# Patient Record
Sex: FEMALE | Race: BLACK OR AFRICAN AMERICAN | NOT HISPANIC OR LATINO
[De-identification: names, ages, dates, MRNs, and addresses within clinical notes are randomized per-mention and may not be internally consistent; named-entity substitution may affect disease eponyms.]

---

## 2019-05-20 ENCOUNTER — RESULT REVIEW (OUTPATIENT)
Age: 34
End: 2019-05-20

## 2019-12-04 ENCOUNTER — ASOB RESULT (OUTPATIENT)
Age: 34
End: 2019-12-04

## 2019-12-04 ENCOUNTER — APPOINTMENT (OUTPATIENT)
Dept: ANTEPARTUM | Facility: CLINIC | Age: 34
End: 2019-12-04

## 2019-12-04 ENCOUNTER — APPOINTMENT (OUTPATIENT)
Dept: ANTEPARTUM | Facility: CLINIC | Age: 34
End: 2019-12-04
Payer: COMMERCIAL

## 2019-12-04 ENCOUNTER — OUTPATIENT (OUTPATIENT)
Dept: OUTPATIENT SERVICES | Facility: HOSPITAL | Age: 34
LOS: 1 days | End: 2019-12-04

## 2019-12-04 PROBLEM — Z00.00 ENCOUNTER FOR PREVENTIVE HEALTH EXAMINATION: Status: ACTIVE | Noted: 2019-12-04

## 2019-12-04 PROCEDURE — 76818 FETAL BIOPHYS PROFILE W/NST: CPT | Mod: 26

## 2019-12-04 PROCEDURE — 76811 OB US DETAILED SNGL FETUS: CPT

## 2019-12-04 PROCEDURE — 99201 OFFICE OUTPATIENT NEW 10 MINUTES: CPT | Mod: 25

## 2019-12-10 ENCOUNTER — ASOB RESULT (OUTPATIENT)
Age: 34
End: 2019-12-10

## 2019-12-10 ENCOUNTER — APPOINTMENT (OUTPATIENT)
Dept: ANTEPARTUM | Facility: CLINIC | Age: 34
End: 2019-12-10
Payer: COMMERCIAL

## 2019-12-10 ENCOUNTER — APPOINTMENT (OUTPATIENT)
Dept: ANTEPARTUM | Facility: HOSPITAL | Age: 34
End: 2019-12-10

## 2019-12-10 PROCEDURE — 76819 FETAL BIOPHYS PROFIL W/O NST: CPT

## 2019-12-16 ENCOUNTER — APPOINTMENT (OUTPATIENT)
Dept: ANTEPARTUM | Facility: HOSPITAL | Age: 34
End: 2019-12-16

## 2019-12-16 ENCOUNTER — APPOINTMENT (OUTPATIENT)
Dept: ANTEPARTUM | Facility: CLINIC | Age: 34
End: 2019-12-16
Payer: COMMERCIAL

## 2019-12-16 ENCOUNTER — OUTPATIENT (OUTPATIENT)
Dept: OUTPATIENT SERVICES | Facility: HOSPITAL | Age: 34
LOS: 1 days | End: 2019-12-16

## 2019-12-16 ENCOUNTER — ASOB RESULT (OUTPATIENT)
Age: 34
End: 2019-12-16

## 2019-12-16 VITALS
TEMPERATURE: 97 F | RESPIRATION RATE: 16 BRPM | OXYGEN SATURATION: 99 % | SYSTOLIC BLOOD PRESSURE: 116 MMHG | DIASTOLIC BLOOD PRESSURE: 62 MMHG | HEART RATE: 92 BPM

## 2019-12-16 DIAGNOSIS — Z87.42 PERSONAL HISTORY OF OTHER DISEASES OF THE FEMALE GENITAL TRACT: Chronic | ICD-10-CM

## 2019-12-16 DIAGNOSIS — Z34.90 ENCOUNTER FOR SUPERVISION OF NORMAL PREGNANCY, UNSPECIFIED, UNSPECIFIED TRIMESTER: ICD-10-CM

## 2019-12-16 DIAGNOSIS — Z98.891 HISTORY OF UTERINE SCAR FROM PREVIOUS SURGERY: Chronic | ICD-10-CM

## 2019-12-16 DIAGNOSIS — O34.219 MATERNAL CARE FOR UNSPECIFIED TYPE SCAR FROM PREVIOUS CESAREAN DELIVERY: ICD-10-CM

## 2019-12-16 LAB
APPEARANCE UR: CLEAR — SIGNIFICANT CHANGE UP
BACTERIA # UR AUTO: NEGATIVE — SIGNIFICANT CHANGE UP
BILIRUB UR-MCNC: NEGATIVE — SIGNIFICANT CHANGE UP
BLD GP AB SCN SERPL QL: NEGATIVE — SIGNIFICANT CHANGE UP
BLOOD UR QL VISUAL: SIGNIFICANT CHANGE UP
COLOR SPEC: YELLOW — SIGNIFICANT CHANGE UP
GLUCOSE UR-MCNC: NEGATIVE — SIGNIFICANT CHANGE UP
HCT VFR BLD CALC: 29.8 % — LOW (ref 34.5–45)
HGB BLD-MCNC: 8.8 G/DL — LOW (ref 11.5–15.5)
HYALINE CASTS # UR AUTO: NEGATIVE — SIGNIFICANT CHANGE UP
KETONES UR-MCNC: NEGATIVE — SIGNIFICANT CHANGE UP
LEUKOCYTE ESTERASE UR-ACNC: SIGNIFICANT CHANGE UP
MCHC RBC-ENTMCNC: 24.8 PG — LOW (ref 27–34)
MCHC RBC-ENTMCNC: 29.5 % — LOW (ref 32–36)
MCV RBC AUTO: 83.9 FL — SIGNIFICANT CHANGE UP (ref 80–100)
NITRITE UR-MCNC: NEGATIVE — SIGNIFICANT CHANGE UP
NRBC # FLD: 0 K/UL — SIGNIFICANT CHANGE UP (ref 0–0)
PH UR: 7 — SIGNIFICANT CHANGE UP (ref 5–8)
PLATELET # BLD AUTO: 134 K/UL — LOW (ref 150–400)
PMV BLD: 11 FL — SIGNIFICANT CHANGE UP (ref 7–13)
PROT UR-MCNC: NEGATIVE — SIGNIFICANT CHANGE UP
RBC # BLD: 3.55 M/UL — LOW (ref 3.8–5.2)
RBC # FLD: 23 % — HIGH (ref 10.3–14.5)
RBC CASTS # UR COMP ASSIST: SIGNIFICANT CHANGE UP (ref 0–?)
RH IG SCN BLD-IMP: POSITIVE — SIGNIFICANT CHANGE UP
SP GR SPEC: 1.01 — SIGNIFICANT CHANGE UP (ref 1–1.04)
SQUAMOUS # UR AUTO: SIGNIFICANT CHANGE UP
UROBILINOGEN FLD QL: NORMAL — SIGNIFICANT CHANGE UP
WBC # BLD: 5.62 K/UL — SIGNIFICANT CHANGE UP (ref 3.8–10.5)
WBC # FLD AUTO: 5.62 K/UL — SIGNIFICANT CHANGE UP (ref 3.8–10.5)
WBC UR QL: SIGNIFICANT CHANGE UP (ref 0–?)

## 2019-12-16 PROCEDURE — 76818 FETAL BIOPHYS PROFILE W/NST: CPT | Mod: 26

## 2019-12-16 RX ORDER — SODIUM CHLORIDE 9 MG/ML
1000 INJECTION, SOLUTION INTRAVENOUS
Refills: 0 | Status: DISCONTINUED | OUTPATIENT
Start: 2019-12-23 | End: 2019-12-23

## 2019-12-16 NOTE — OB PST NOTE - NSHPREVIEWOFSYSTEMS_GEN_ALL_CORE
General: No fever, chills, sweating,   Skin: No rashes, itching, or dryness.   Breast: No tenderness, lumps, or nipple discharge      Ophthalmologic: No diplopia, photophobia,   ENMT Symptoms: No hearing difficulty, ear pain, tinnitus, or vertigo. No sinus symptoms, nasal congestion, nasal   discharge, or nasal obstruction    Respiratory and Thorax: No wheezing, dyspnea, cough,   Cardiovascular: Pt c/o of swelling of ankles - evaluated by MD today. No chest pain, palpitations,     Gastrointestinal: No nausea, vomiting,   Genitourinary/ Pelvis: No hematuria, or vaginal bleeding     Musculoskeletal: No arthralgia, arthritis, joint swelling, muscle cramping, muscle weakness, neck pain, arm pain, or leg pain    Neurological: No transient paralysis, weakness, paresthesias, or seizures. No syncope, tremors, vertigo, loss of sensation, difficulty walking, loss of consciousness, hemiparesis, confusion, or facial palsy    Psychiatric: No suicidal ideation, depression, anxiety,   Hematology: No gum bleeding, nose bleeding, or skin lumps    Lymphatic: No enlarged or tender lymph nodes.   Endocrine: No heat or cold intolerance    Immunologic: No recurrent or persistent infections

## 2019-12-16 NOTE — OB PST NOTE - PROBLEM SELECTOR PLAN 1
Pt scheduled for surgery and preop instructions including instructions  for Chlorhexidine use in showering on the day of surgery, given verbally and with use of  written materials, and patient confirming understanding of such instructions using  teach back method.  OR Booking notified of risk for sleep apnea

## 2019-12-16 NOTE — OB PST NOTE - NSANTHOSAYNRD_GEN_A_CORE
No. JOHANNE screening performed.  STOP BANG Legend: 0-2 = LOW Risk; 3-4 = INTERMEDIATE Risk; 5-8 = HIGH Risk

## 2019-12-16 NOTE — OB PST NOTE - HISTORY OF PRESENT ILLNESS
Pt is a 34 yr old female scheduled for Repeat  19 with Dr Salazar - pt is 38 weeks Gestation ANNELISE 19 , T1 -  2016 at 42 weeks for fetal distress - hx of macrosomia and pt seen today by OB with fetal heartbeat heard and pt confirms fetal movement today. Pt denies GDM or HTN during pregnancy - pt denies vaginal bleeding or contractions at this time.

## 2019-12-16 NOTE — OB PST NOTE - MALLAMPATI CLASS
Class II - visualization of the soft palate, fauces, and uvula Class IV (difficult) - the soft palate is not visible at all/with phonation

## 2019-12-16 NOTE — OB PST NOTE - NSHPPHYSICALEXAM_GEN_ALL_CORE
Constitutional: Well Developed, Well Groomed, Well Nourished, No Distress    Eyes: PERRL, EOMI, conjunctiva clear    Ears: Normal    Mouth & Gums: Normal, moist    Neck: Supple, no JVD,   Breast: Normal shape,   Back: Pt c/o of mild lower back pain - Normal shape, ROM intact, strength intact,   Respiratory: Airway patent, breath sounds equal, good air movement, respiration non-labored, clear to auscultation bilateral,     Cardiovascular:  Regular rate and rhythm,   Gastrointestinal: Normal   Extremities: Bilateral ankle edema +2-  No clubbing, cyanosis,  Vascular:  Carotid Pulse normal , Radial Pulse normal, Femoral Pulse normal, DP pulse normal, PT pulse normal    Neurological: alert & oriented x 3, sensation intact, deep reflexes intact, cranial nerve intact, normal strength    Skin: warm and dry, normal color    Lymph Nodes: normal posterior cervical lymph node, normal anterior cervical lymph node, normal supraclavicular lymph node,     Musculoskeletal: ROM intact, no joint swelling, warmth, or calf tenderness. Normal strength    Psychiatric: normal affect, normal behavior

## 2019-12-17 LAB — T PALLIDUM AB TITR SER: NEGATIVE — SIGNIFICANT CHANGE UP

## 2019-12-22 ENCOUNTER — TRANSCRIPTION ENCOUNTER (OUTPATIENT)
Age: 34
End: 2019-12-22

## 2019-12-23 ENCOUNTER — INPATIENT (INPATIENT)
Facility: HOSPITAL | Age: 34
LOS: 2 days | Discharge: HOME | End: 2019-12-26
Attending: OBSTETRICS & GYNECOLOGY | Admitting: OBSTETRICS & GYNECOLOGY
Payer: COMMERCIAL

## 2019-12-23 VITALS
SYSTOLIC BLOOD PRESSURE: 108 MMHG | TEMPERATURE: 98 F | DIASTOLIC BLOOD PRESSURE: 61 MMHG | HEIGHT: 69 IN | HEART RATE: 88 BPM | RESPIRATION RATE: 18 BRPM | WEIGHT: 250 LBS

## 2019-12-23 DIAGNOSIS — O28.4 ABNORMAL RADIOLOGICAL FINDING ON ANTENATAL SCREENING OF MOTHER: ICD-10-CM

## 2019-12-23 DIAGNOSIS — Z87.42 PERSONAL HISTORY OF OTHER DISEASES OF THE FEMALE GENITAL TRACT: Chronic | ICD-10-CM

## 2019-12-23 DIAGNOSIS — O40.3XX0 POLYHYDRAMNIOS, THIRD TRIMESTER, NOT APPLICABLE OR UNSPECIFIED: ICD-10-CM

## 2019-12-23 DIAGNOSIS — Z98.891 HISTORY OF UTERINE SCAR FROM PREVIOUS SURGERY: ICD-10-CM

## 2019-12-23 DIAGNOSIS — O36.63X0 MATERNAL CARE FOR EXCESSIVE FETAL GROWTH, THIRD TRIMESTER, NOT APPLICABLE OR UNSPECIFIED: ICD-10-CM

## 2019-12-23 DIAGNOSIS — Z98.891 HISTORY OF UTERINE SCAR FROM PREVIOUS SURGERY: Chronic | ICD-10-CM

## 2019-12-23 DIAGNOSIS — O34.219 MATERNAL CARE FOR UNSPECIFIED TYPE SCAR FROM PREVIOUS CESAREAN DELIVERY: ICD-10-CM

## 2019-12-23 LAB
ALBUMIN SERPL ELPH-MCNC: 2.8 G/DL — LOW (ref 3.3–5)
ALP SERPL-CCNC: 134 U/L — HIGH (ref 40–120)
ALT FLD-CCNC: 5 U/L — SIGNIFICANT CHANGE UP (ref 4–33)
ANION GAP SERPL CALC-SCNC: 10 MMO/L — SIGNIFICANT CHANGE UP (ref 7–14)
APTT BLD: 30.3 SEC — SIGNIFICANT CHANGE UP (ref 27.5–36.3)
APTT BLD: 35.7 SEC — SIGNIFICANT CHANGE UP (ref 27.5–36.3)
AST SERPL-CCNC: 12 U/L — SIGNIFICANT CHANGE UP (ref 4–32)
BASE EXCESS BLDA CALC-SCNC: -3.3 MMOL/L — SIGNIFICANT CHANGE UP
BASOPHILS # BLD AUTO: 0.01 K/UL — SIGNIFICANT CHANGE UP (ref 0–0.2)
BASOPHILS NFR BLD AUTO: 0.2 % — SIGNIFICANT CHANGE UP (ref 0–2)
BILIRUB SERPL-MCNC: 0.4 MG/DL — SIGNIFICANT CHANGE UP (ref 0.2–1.2)
BLD GP AB SCN SERPL QL: NEGATIVE — SIGNIFICANT CHANGE UP
BUN SERPL-MCNC: 4 MG/DL — LOW (ref 7–23)
CA-I BLDA-SCNC: 1.17 MMOL/L — SIGNIFICANT CHANGE UP (ref 1.15–1.29)
CALCIUM SERPL-MCNC: 8.3 MG/DL — LOW (ref 8.4–10.5)
CHLORIDE SERPL-SCNC: 108 MMOL/L — HIGH (ref 98–107)
CO2 SERPL-SCNC: 19 MMOL/L — LOW (ref 22–31)
CREAT SERPL-MCNC: 0.47 MG/DL — LOW (ref 0.5–1.3)
EOSINOPHIL # BLD AUTO: 0 K/UL — SIGNIFICANT CHANGE UP (ref 0–0.5)
EOSINOPHIL NFR BLD AUTO: 0 % — SIGNIFICANT CHANGE UP (ref 0–6)
FIBRINOGEN PPP-MCNC: 286 MG/DL — LOW (ref 350–510)
GLUCOSE BLDA-MCNC: 121 MG/DL — HIGH (ref 70–99)
GLUCOSE SERPL-MCNC: 101 MG/DL — HIGH (ref 70–99)
HCO3 BLDA-SCNC: 22 MMOL/L — SIGNIFICANT CHANGE UP (ref 22–26)
HCT VFR BLD CALC: 24.9 % — LOW (ref 34.5–45)
HCT VFR BLD CALC: 27.8 % — LOW (ref 34.5–45)
HCT VFR BLDA CALC: 24.1 % — LOW (ref 34.5–46.5)
HGB BLD-MCNC: 7.6 G/DL — LOW (ref 11.5–15.5)
HGB BLD-MCNC: 8.6 G/DL — LOW (ref 11.5–15.5)
HGB BLDA-MCNC: 7.7 G/DL — LOW (ref 11.5–15.5)
IMM GRANULOCYTES NFR BLD AUTO: 1.1 % — SIGNIFICANT CHANGE UP (ref 0–1.5)
INR BLD: 1.01 — SIGNIFICANT CHANGE UP (ref 0.88–1.17)
INR BLD: 1.1 — SIGNIFICANT CHANGE UP (ref 0.88–1.17)
LACTATE BLDA-SCNC: 2.4 MMOL/L — HIGH (ref 0.5–2)
LDH SERPL L TO P-CCNC: 178 U/L — SIGNIFICANT CHANGE UP (ref 135–225)
LYMPHOCYTES # BLD AUTO: 0.93 K/UL — LOW (ref 1–3.3)
LYMPHOCYTES # BLD AUTO: 15.2 % — SIGNIFICANT CHANGE UP (ref 13–44)
MCHC RBC-ENTMCNC: 24.5 PG — LOW (ref 27–34)
MCHC RBC-ENTMCNC: 24.7 PG — LOW (ref 27–34)
MCHC RBC-ENTMCNC: 30.5 % — LOW (ref 32–36)
MCHC RBC-ENTMCNC: 30.9 % — LOW (ref 32–36)
MCV RBC AUTO: 79.9 FL — LOW (ref 80–100)
MCV RBC AUTO: 80.3 FL — SIGNIFICANT CHANGE UP (ref 80–100)
MONOCYTES # BLD AUTO: 0.3 K/UL — SIGNIFICANT CHANGE UP (ref 0–0.9)
MONOCYTES NFR BLD AUTO: 4.9 % — SIGNIFICANT CHANGE UP (ref 2–14)
NEUTROPHILS # BLD AUTO: 4.82 K/UL — SIGNIFICANT CHANGE UP (ref 1.8–7.4)
NEUTROPHILS NFR BLD AUTO: 78.6 % — HIGH (ref 43–77)
NRBC # FLD: 0 K/UL — SIGNIFICANT CHANGE UP (ref 0–0)
NRBC # FLD: 0 K/UL — SIGNIFICANT CHANGE UP (ref 0–0)
PCO2 BLDA: 37 MMHG — SIGNIFICANT CHANGE UP (ref 32–48)
PH BLDA: 7.38 PH — SIGNIFICANT CHANGE UP (ref 7.35–7.45)
PLATELET # BLD AUTO: 101 K/UL — LOW (ref 150–400)
PLATELET # BLD AUTO: 113 K/UL — LOW (ref 150–400)
PMV BLD: 10.3 FL — SIGNIFICANT CHANGE UP (ref 7–13)
PMV BLD: 10.3 FL — SIGNIFICANT CHANGE UP (ref 7–13)
PO2 BLDA: 144 MMHG — HIGH (ref 83–108)
POTASSIUM BLDA-SCNC: 3.8 MMOL/L — SIGNIFICANT CHANGE UP (ref 3.4–4.5)
POTASSIUM SERPL-MCNC: 3.7 MMOL/L — SIGNIFICANT CHANGE UP (ref 3.5–5.3)
POTASSIUM SERPL-SCNC: 3.7 MMOL/L — SIGNIFICANT CHANGE UP (ref 3.5–5.3)
PROT SERPL-MCNC: 5.2 G/DL — LOW (ref 6–8.3)
PROTHROM AB SERPL-ACNC: 11.5 SEC — SIGNIFICANT CHANGE UP (ref 9.8–13.1)
PROTHROM AB SERPL-ACNC: 12.6 SEC — SIGNIFICANT CHANGE UP (ref 9.8–13.1)
RBC # BLD: 3.1 M/UL — LOW (ref 3.8–5.2)
RBC # BLD: 3.48 M/UL — LOW (ref 3.8–5.2)
RBC # FLD: 21.9 % — HIGH (ref 10.3–14.5)
RBC # FLD: 22.1 % — HIGH (ref 10.3–14.5)
RH IG SCN BLD-IMP: POSITIVE — SIGNIFICANT CHANGE UP
SAO2 % BLDA: 98.6 % — SIGNIFICANT CHANGE UP (ref 95–99)
SODIUM BLDA-SCNC: 134 MMOL/L — LOW (ref 136–146)
SODIUM SERPL-SCNC: 137 MMOL/L — SIGNIFICANT CHANGE UP (ref 135–145)
URATE SERPL-MCNC: 2.9 MG/DL — SIGNIFICANT CHANGE UP (ref 2.5–7)
WBC # BLD: 4.91 K/UL — SIGNIFICANT CHANGE UP (ref 3.8–10.5)
WBC # BLD: 6.13 K/UL — SIGNIFICANT CHANGE UP (ref 3.8–10.5)
WBC # FLD AUTO: 4.91 K/UL — SIGNIFICANT CHANGE UP (ref 3.8–10.5)
WBC # FLD AUTO: 6.13 K/UL — SIGNIFICANT CHANGE UP (ref 3.8–10.5)

## 2019-12-23 RX ORDER — DIPHENHYDRAMINE HCL 50 MG
25 CAPSULE ORAL EVERY 6 HOURS
Refills: 0 | Status: DISCONTINUED | OUTPATIENT
Start: 2019-12-23 | End: 2019-12-23

## 2019-12-23 RX ORDER — SODIUM CHLORIDE 9 MG/ML
1000 INJECTION, SOLUTION INTRAVENOUS ONCE
Refills: 0 | Status: COMPLETED | OUTPATIENT
Start: 2019-12-23 | End: 2019-12-23

## 2019-12-23 RX ORDER — LANOLIN
1 OINTMENT (GRAM) TOPICAL EVERY 6 HOURS
Refills: 0 | Status: DISCONTINUED | OUTPATIENT
Start: 2019-12-23 | End: 2019-12-23

## 2019-12-23 RX ORDER — TETANUS TOXOID, REDUCED DIPHTHERIA TOXOID AND ACELLULAR PERTUSSIS VACCINE, ADSORBED 5; 2.5; 8; 8; 2.5 [IU]/.5ML; [IU]/.5ML; UG/.5ML; UG/.5ML; UG/.5ML
0.5 SUSPENSION INTRAMUSCULAR ONCE
Refills: 0 | Status: DISCONTINUED | OUTPATIENT
Start: 2019-12-23 | End: 2019-12-23

## 2019-12-23 RX ORDER — HEPARIN SODIUM 5000 [USP'U]/ML
5000 INJECTION INTRAVENOUS; SUBCUTANEOUS EVERY 12 HOURS
Refills: 0 | Status: DISCONTINUED | OUTPATIENT
Start: 2019-12-23 | End: 2019-12-23

## 2019-12-23 RX ORDER — DIPHENHYDRAMINE HCL 50 MG
25 CAPSULE ORAL EVERY 6 HOURS
Refills: 0 | Status: DISCONTINUED | OUTPATIENT
Start: 2019-12-23 | End: 2019-12-26

## 2019-12-23 RX ORDER — OXYTOCIN 10 UNIT/ML
41.67 VIAL (ML) INJECTION
Qty: 20 | Refills: 0 | Status: DISCONTINUED | OUTPATIENT
Start: 2019-12-23 | End: 2019-12-23

## 2019-12-23 RX ORDER — METOCLOPRAMIDE HCL 10 MG
10 TABLET ORAL ONCE
Refills: 0 | Status: COMPLETED | OUTPATIENT
Start: 2019-12-23 | End: 2019-12-23

## 2019-12-23 RX ORDER — SODIUM CHLORIDE 9 MG/ML
1000 INJECTION, SOLUTION INTRAVENOUS
Refills: 0 | Status: DISCONTINUED | OUTPATIENT
Start: 2019-12-23 | End: 2019-12-23

## 2019-12-23 RX ORDER — FAMOTIDINE 10 MG/ML
20 INJECTION INTRAVENOUS ONCE
Refills: 0 | Status: COMPLETED | OUTPATIENT
Start: 2019-12-23 | End: 2019-12-23

## 2019-12-23 RX ORDER — KETOROLAC TROMETHAMINE 30 MG/ML
30 SYRINGE (ML) INJECTION EVERY 6 HOURS
Refills: 0 | Status: DISCONTINUED | OUTPATIENT
Start: 2019-12-23 | End: 2019-12-23

## 2019-12-23 RX ORDER — OXYCODONE HYDROCHLORIDE 5 MG/1
5 TABLET ORAL
Refills: 0 | Status: DISCONTINUED | OUTPATIENT
Start: 2019-12-23 | End: 2019-12-23

## 2019-12-23 RX ORDER — MAGNESIUM HYDROXIDE 400 MG/1
30 TABLET, CHEWABLE ORAL
Refills: 0 | Status: DISCONTINUED | OUTPATIENT
Start: 2019-12-23 | End: 2019-12-26

## 2019-12-23 RX ORDER — ACETAMINOPHEN 500 MG
975 TABLET ORAL
Refills: 0 | Status: DISCONTINUED | OUTPATIENT
Start: 2019-12-23 | End: 2019-12-26

## 2019-12-23 RX ORDER — MAGNESIUM HYDROXIDE 400 MG/1
30 TABLET, CHEWABLE ORAL
Refills: 0 | Status: DISCONTINUED | OUTPATIENT
Start: 2019-12-23 | End: 2019-12-23

## 2019-12-23 RX ORDER — CEFAZOLIN SODIUM 1 G
2000 VIAL (EA) INJECTION EVERY 8 HOURS
Refills: 0 | Status: COMPLETED | OUTPATIENT
Start: 2019-12-23 | End: 2019-12-24

## 2019-12-23 RX ORDER — INFLUENZA VIRUS VACCINE 15; 15; 15; 15 UG/.5ML; UG/.5ML; UG/.5ML; UG/.5ML
0.5 SUSPENSION INTRAMUSCULAR ONCE
Refills: 0 | Status: DISCONTINUED | OUTPATIENT
Start: 2019-12-23 | End: 2019-12-26

## 2019-12-23 RX ORDER — GLYCERIN ADULT
1 SUPPOSITORY, RECTAL RECTAL AT BEDTIME
Refills: 0 | Status: DISCONTINUED | OUTPATIENT
Start: 2019-12-23 | End: 2019-12-23

## 2019-12-23 RX ORDER — SIMETHICONE 80 MG/1
80 TABLET, CHEWABLE ORAL EVERY 4 HOURS
Refills: 0 | Status: DISCONTINUED | OUTPATIENT
Start: 2019-12-23 | End: 2019-12-26

## 2019-12-23 RX ORDER — HYDROMORPHONE HYDROCHLORIDE 2 MG/ML
1 INJECTION INTRAMUSCULAR; INTRAVENOUS; SUBCUTANEOUS ONCE
Refills: 0 | Status: DISCONTINUED | OUTPATIENT
Start: 2019-12-23 | End: 2019-12-23

## 2019-12-23 RX ORDER — ACETAMINOPHEN 500 MG
975 TABLET ORAL
Refills: 0 | Status: DISCONTINUED | OUTPATIENT
Start: 2019-12-23 | End: 2019-12-23

## 2019-12-23 RX ORDER — OXYCODONE HYDROCHLORIDE 5 MG/1
5 TABLET ORAL ONCE
Refills: 0 | Status: DISCONTINUED | OUTPATIENT
Start: 2019-12-23 | End: 2019-12-26

## 2019-12-23 RX ORDER — SODIUM CHLORIDE 9 MG/ML
1000 INJECTION, SOLUTION INTRAVENOUS
Refills: 0 | Status: DISCONTINUED | OUTPATIENT
Start: 2019-12-23 | End: 2019-12-24

## 2019-12-23 RX ORDER — CITRIC ACID/SODIUM CITRATE 300-500 MG
30 SOLUTION, ORAL ORAL ONCE
Refills: 0 | Status: COMPLETED | OUTPATIENT
Start: 2019-12-23 | End: 2019-12-23

## 2019-12-23 RX ORDER — IBUPROFEN 200 MG
600 TABLET ORAL EVERY 6 HOURS
Refills: 0 | Status: DISCONTINUED | OUTPATIENT
Start: 2019-12-23 | End: 2019-12-23

## 2019-12-23 RX ORDER — CARBOPROST TROMETHAMINE 250 UG/ML
250 INJECTION, SOLUTION INTRAMUSCULAR ONCE
Refills: 0 | Status: COMPLETED | OUTPATIENT
Start: 2019-12-23 | End: 2019-12-23

## 2019-12-23 RX ORDER — GLYCERIN ADULT
1 SUPPOSITORY, RECTAL RECTAL AT BEDTIME
Refills: 0 | Status: DISCONTINUED | OUTPATIENT
Start: 2019-12-23 | End: 2019-12-26

## 2019-12-23 RX ORDER — OXYTOCIN 10 UNIT/ML
41.67 VIAL (ML) INJECTION
Qty: 20 | Refills: 0 | Status: DISCONTINUED | OUTPATIENT
Start: 2019-12-23 | End: 2019-12-24

## 2019-12-23 RX ORDER — TETANUS TOXOID, REDUCED DIPHTHERIA TOXOID AND ACELLULAR PERTUSSIS VACCINE, ADSORBED 5; 2.5; 8; 8; 2.5 [IU]/.5ML; [IU]/.5ML; UG/.5ML; UG/.5ML; UG/.5ML
0.5 SUSPENSION INTRAMUSCULAR ONCE
Refills: 0 | Status: DISCONTINUED | OUTPATIENT
Start: 2019-12-23 | End: 2019-12-26

## 2019-12-23 RX ORDER — IBUPROFEN 200 MG
600 TABLET ORAL EVERY 6 HOURS
Refills: 0 | Status: COMPLETED | OUTPATIENT
Start: 2019-12-23 | End: 2020-11-20

## 2019-12-23 RX ORDER — OXYCODONE HYDROCHLORIDE 5 MG/1
5 TABLET ORAL ONCE
Refills: 0 | Status: DISCONTINUED | OUTPATIENT
Start: 2019-12-23 | End: 2019-12-23

## 2019-12-23 RX ORDER — ACETAMINOPHEN 500 MG
1000 TABLET ORAL ONCE
Refills: 0 | Status: COMPLETED | OUTPATIENT
Start: 2019-12-23 | End: 2019-12-24

## 2019-12-23 RX ORDER — TRANEXAMIC ACID 100 MG/ML
1000 INJECTION, SOLUTION INTRAVENOUS ONCE
Refills: 0 | Status: COMPLETED | OUTPATIENT
Start: 2019-12-23 | End: 2019-12-23

## 2019-12-23 RX ORDER — SIMETHICONE 80 MG/1
80 TABLET, CHEWABLE ORAL EVERY 4 HOURS
Refills: 0 | Status: DISCONTINUED | OUTPATIENT
Start: 2019-12-23 | End: 2019-12-23

## 2019-12-23 RX ORDER — LANOLIN
1 OINTMENT (GRAM) TOPICAL EVERY 6 HOURS
Refills: 0 | Status: DISCONTINUED | OUTPATIENT
Start: 2019-12-23 | End: 2019-12-26

## 2019-12-23 RX ORDER — OXYCODONE HYDROCHLORIDE 5 MG/1
5 TABLET ORAL
Refills: 0 | Status: COMPLETED | OUTPATIENT
Start: 2019-12-23 | End: 2019-12-30

## 2019-12-23 RX ORDER — HEPARIN SODIUM 5000 [USP'U]/ML
5000 INJECTION INTRAVENOUS; SUBCUTANEOUS EVERY 12 HOURS
Refills: 0 | Status: DISCONTINUED | OUTPATIENT
Start: 2019-12-23 | End: 2019-12-24

## 2019-12-23 RX ADMIN — HYDROMORPHONE HYDROCHLORIDE 1 MILLIGRAM(S): 2 INJECTION INTRAMUSCULAR; INTRAVENOUS; SUBCUTANEOUS at 19:06

## 2019-12-23 RX ADMIN — Medication 0.2 MILLIGRAM(S): at 18:59

## 2019-12-23 RX ADMIN — SODIUM CHLORIDE 2000 MILLILITER(S): 9 INJECTION, SOLUTION INTRAVENOUS at 18:27

## 2019-12-23 RX ADMIN — SODIUM CHLORIDE 125 MILLILITER(S): 9 INJECTION, SOLUTION INTRAVENOUS at 12:29

## 2019-12-23 RX ADMIN — FAMOTIDINE 20 MILLIGRAM(S): 10 INJECTION INTRAVENOUS at 12:29

## 2019-12-23 RX ADMIN — Medication 10 MILLIGRAM(S): at 12:28

## 2019-12-23 RX ADMIN — HYDROMORPHONE HYDROCHLORIDE 1 MILLIGRAM(S): 2 INJECTION INTRAMUSCULAR; INTRAVENOUS; SUBCUTANEOUS at 21:19

## 2019-12-23 RX ADMIN — SODIUM CHLORIDE 2000 MILLILITER(S): 9 INJECTION, SOLUTION INTRAVENOUS at 12:29

## 2019-12-23 RX ADMIN — CARBOPROST TROMETHAMINE 250 MICROGRAM(S): 250 INJECTION, SOLUTION INTRAMUSCULAR at 19:47

## 2019-12-23 RX ADMIN — Medication 125 MILLIUNIT(S)/MIN: at 18:28

## 2019-12-23 RX ADMIN — TRANEXAMIC ACID 220 MILLIGRAM(S): 100 INJECTION, SOLUTION INTRAVENOUS at 19:04

## 2019-12-23 RX ADMIN — SODIUM CHLORIDE 75 MILLILITER(S): 9 INJECTION, SOLUTION INTRAVENOUS at 21:31

## 2019-12-23 RX ADMIN — Medication 125 MILLIUNIT(S)/MIN: at 21:31

## 2019-12-23 RX ADMIN — Medication 30 MILLILITER(S): at 12:29

## 2019-12-23 RX ADMIN — Medication 0.2 MILLIGRAM(S): at 19:49

## 2019-12-23 NOTE — OB PROVIDER H&P - ASSESSMENT
Admit to L&D  maverick Arnav/ MD Martin  NPO  routine labs  EFM/TOCO  Bedside TLTAS  anesthesia consult  Gita Raygoza PAC  12-23-19 @ 11:48

## 2019-12-23 NOTE — PROVIDER CONTACT NOTE (OTHER) - ACTION/TREATMENT ORDERED:
VE by MD. OB and anesthesia teams called to beside stat. Methergine 0.2mg, TXA 1g, Dilaudid 1mg, bakri balloon placed, x-match 2 units PC's, 2nd PIV line, labs-CBC, coags, CMP, cytotec 1000mcg, to OR

## 2019-12-23 NOTE — PROVIDER CONTACT NOTE (OTHER) - SITUATION
RN called to PACU 5 by family member reporting pt states she feels a lot of bleeding. Excessive vaginal bleeding with clots noted.

## 2019-12-23 NOTE — OB PROVIDER H&P - ATTENDING COMMENTS
35 yo P1 with IUP 39 wks ga presents for elective RCS. patient obstetrical history significant for previous cs for macrosomia 10lbs and chronic anemia., gestational thrombocytopenia. Patient has received several IV iron infusions for anemia. Otherwise current prenatal course uncomplicated. Patient offers no complaints today. Reports active fetal movements. Indications of RCS reviewed due to LGA with current pregnancy.  Risks and benefits discussed. Risks include but not limited to scar tissue formation, intraop blood loss, injury to adjacent organs. Consent signed and witnessed. All questions and concerns addressed. Anticipate uncomplicated intraop and postop course. MBeauvil

## 2019-12-23 NOTE — OB PROVIDER H&P - NSHPLABSRESULTS_GEN_ALL_CORE
Complete Blood Count (12.16.19 @ 17:05)    WBC Count: 5.62 K/uL    Hemoglobin: 8.8 g/dL    Hematocrit: 29.8 %    Platelet Count - Automated: 134 K/uL      Type + Screen (12.16.19 @ 15:56)    ABO Interpretation: A    Rh Interpretation: Positive    Antibody Screen: Negative

## 2019-12-23 NOTE — CHART NOTE - NSCHARTNOTEFT_GEN_A_CORE
R4 Note    Called to PACU to evaluate pt for increase postoperative vaginal bleeding. Pt s/p scheduled rLTCS with QBL 817ml. Pt recovering well in PACU until bleeding.   Pt seen at bedside, brisk VB noted on evaluation. Vaginal exam expressed ~500ml of bright red bleeding and clots. Cervical os opened 1-2cm and OMAR noted to be atonic. Fundus firm on evaluation. Called for ultrasound, safety officers. Methergine IM administered, IV TXA administered. Bakri balloon placed under ultrasound guidance but with minimal output (likely 2/2 kinking). Nursing unable to get IV at bedside at this time and anesthesia called. Of note, pt mentating well throughout event, Dilaudid 1mg administered for pain control. VS noted to be wnl at this time. Cytotec CO administered. Vaginal packing x 3 performed and decision made to transfer to OR for further evaluation and management as pt continued to have brisk bleeding.    Dr. Salazar notified of events during pt transfer to OR.    In OR, pt placed in dorsolithotomy position. 2 large bore IV and R radial A-line placed by anesthesia, STAT labs sent. Vaginal packing and Bakri removed at this time and vagina and cervix re-assessed for lacerations. No lacerations noted, bleeding likely 2/2 OMAR atony. Decision made to place second Bakri balloon. Bakri balloon placed under sono guidance with output noted to be 125ml intitially.  x 3 placed without incident. Pitocin bolus continued, methergine#2 administered and hemabate given.    2uPRBC administered as fast as possible and 1u 5%albumin. Pt noted to be tachycardic (120-130s during Bakri placement w/tachycardia downtrending to 90s). BPs holding wnl, not requiring pressors. Ancef re-dosed by anesthesia (2g).    STAT labs:                          7.6    6.13  )-----------( 101      ( 23 Dec 2019 19:30 )             24.9     PT/INR - ( 23 Dec 2019 19:30 )   PT: 12.6 SEC;   INR: 1.10          PTT - ( 23 Dec 2019 19:30 )  PTT:35.7 SEC    Fibrinogen 286    Lactate 2.4    Dr. Salazar at bedside after event. Plan to watch pt, VS, Bakri and UOP in OR for another 30 min and then transfer to PACU for further close observation. Pt to have CBC/CMP/coags/lactate repeat in 4 hours (12:30am). Plan to c/w Bakri and . Ancef 2g q8h x24 hrs of Bakri.    Dr. Corcoran, Dr. Gallo, and Dr. Hunter at bedside during event.  Jesús, pgy4

## 2019-12-23 NOTE — CHART NOTE - NSCHARTNOTEFT_GEN_A_CORE
Attending Note    Was notified by Attending () regarding evaluation of patient for postpartum hemorrhage in the PACU. Patient reports she felt fluid coming out of vagina thought it was urine. Upon evaluation by RN brisk bright red blood was noted on chucks. Senior resident and Attending evaluated patient and diagnosed patient with hemorrhage. Patient received multiple uterotonics and Bakri balloon. Patient was taken to OR where vaginal exam was performed thoroughly.  Large amount of blood noted in vaginal vault. Atony of lower uterine segment noted. Bakri replaced and three vaginal packings inserted inside of vagina.  UPon my arrival to the OR 35minutes later patient was reassessed. Patient is s/p 2units of PRBCS and 1unit of Albumin. Patient was awake alert, oriented x 3, normotensive and stable vital signs. Uterine fundus was noted to be firm adequate urine output 7-8pm UO 175ml and Bakri 125ml form 7-8pm. Stat h/h 7.4/24.  Will continue to monitor vitals closely and output.  Plan of care discussed with patient and .     VS T36.9   P 107/68  P 94 O2 sat 100% on 2L NC    heent nl  abd fundus firm  ext no CCe  Neuro AAOx 3    A:  POD #2 s/p Scheduled RCS with postpartum hemorrhage secondary to atony of lower uterine segment, s/p multiple uterotonics, TXA 1g IV, s/p placement of Bakri balloon and vaginal packing, s/p 2units of PRBCs and 1unit of Albumin, ( ml intraop     1940ml  postop)   P: Strict I's O's  P: Monitor hourly output of Bakri balloon     Continue Ancef x 24hrs     Repeat labs 12am     IR on standy     Patient understands if conservative management fails, surgical intervention will be warranted     All questions and concerns addressed     Mary

## 2019-12-23 NOTE — PROVIDER CONTACT NOTE (OTHER) - BACKGROUND
s/p repeat c/s at 39 weeks. Delivered female baby at 1633, 4070g. QBL by triton 817 ml intra-op. Pt admitted to PACU at 1734. Pt has hx of anemia with multiple iron transfusions during the pregnancy.

## 2019-12-23 NOTE — OB PROVIDER H&P - NSHPPHYSICALEXAM_GEN_ALL_CORE
SpO2: --Height (cm): 175.26 (12-23-19 @ 10:30)  Weight (kg): 113.4 (12-23-19 @ 10:30)  BMI (kg/m2): 36.9 (12-23-19 @ 10:30)  BSA (m2): 2.27 (12-23-19 @ 10:30)    A&Ox3  Heart: RRR, S1&S2, no S3  Lungs: Clear bilateral to auscultation, good inspiratory /expiratory effort              no rhonchi, no rales  Abd: soft, Gravid, TOCO in place           +pfannenstial scar  EFM: 120bpm/moderate variabilty/+accelerations/no decelerations/CAT 1  TOCO:  no UC  Ext:  FROM / no edema

## 2019-12-23 NOTE — OB PROVIDER DELIVERY SUMMARY - NSPROVIDERDELIVERYNOTE_OBGYN_ALL_OB_FT
A live female infant was born in LOT position via Repeat LTCS and lysis of adhesions. Apgar 8-8, 4070g.  Normal tubes and ovaries b/l. Urinary bladder adhered on Pyrimidalis muscles. Dense adhesions along abdominal wall and fascia.  No intraop complications. IVF 3L LR. UO 60ml clear urine. Sponge,needle and instrument count correct x 2. Infant to  nursery in stable condition. Mother to recovery room in stable condition. See full dictation. MBeauvil

## 2019-12-23 NOTE — PROVIDER CONTACT NOTE (OTHER) - ASSESSMENT
VSS. Pt alert and oriented.  Uterus firm and  1F above the umbilicus. Lochia excessive with clots. EBL 1000cc by MD's. Tolbert catheter draining clear yellow urine. Total IVF infusing at 200cc/h. Pt recevied 1 liter bolus RL when admitted to PACU for low B/P.

## 2019-12-23 NOTE — BRIEF OPERATIVE NOTE - OPERATION/FINDINGS
Grossly normal uterus, tubes and ovaries bilaterally. Viable female infant delivered from vertex presentation. Clear fluid on amniotomy. Vigorous cry on the abdomen. APGARS 8,8. Dense scar tissue under fascia, taken down sharply with the scalpel. Maylard incision used on rectus muscles due adherent bladder inferiorly. Hysterotomy closed in a running locked fashion in a single layer. Grossly normal uterus, tubes and ovaries bilaterally. Viable female infant delivered from vertex presentation. Clear fluid on amniotomy. Vigorous cry on the abdomen. APGARS 8,8. Weight 4070g. Dense scar tissue under fascia, taken down sharply with the scalpel. Maylard incision used on rectus muscles due adherent bladder inferiorly. Hysterotomy closed in a running locked fashion in a single layer.

## 2019-12-23 NOTE — OB PROVIDER H&P - HISTORY OF PRESENT ILLNESS
33yo female  EDC/ presents@39wks for scheduled   rltcs due to.  +AP course iron infusions due to severe anemia, last infusion .  Anemia of unknown cause, hematological  w/u  negative, otherwise unremarkable.   Denies VB,ROM or UCs today.  +FM

## 2019-12-24 ENCOUNTER — APPOINTMENT (OUTPATIENT)
Dept: ANTEPARTUM | Facility: HOSPITAL | Age: 34
End: 2019-12-24

## 2019-12-24 ENCOUNTER — ASOB RESULT (OUTPATIENT)
Age: 34
End: 2019-12-24

## 2019-12-24 PROBLEM — D64.9 ANEMIA, UNSPECIFIED: Chronic | Status: ACTIVE | Noted: 2019-12-16

## 2019-12-24 LAB
ALBUMIN SERPL ELPH-MCNC: 2.6 G/DL — LOW (ref 3.3–5)
ALBUMIN SERPL ELPH-MCNC: 2.7 G/DL — LOW (ref 3.3–5)
ALP SERPL-CCNC: 101 U/L — SIGNIFICANT CHANGE UP (ref 40–120)
ALP SERPL-CCNC: 111 U/L — SIGNIFICANT CHANGE UP (ref 40–120)
ALT FLD-CCNC: 5 U/L — SIGNIFICANT CHANGE UP (ref 4–33)
ALT FLD-CCNC: 5 U/L — SIGNIFICANT CHANGE UP (ref 4–33)
ANION GAP SERPL CALC-SCNC: 11 MMO/L — SIGNIFICANT CHANGE UP (ref 7–14)
ANION GAP SERPL CALC-SCNC: 11 MMO/L — SIGNIFICANT CHANGE UP (ref 7–14)
ANION GAP SERPL CALC-SCNC: 12 MMO/L — SIGNIFICANT CHANGE UP (ref 7–14)
ANION GAP SERPL CALC-SCNC: 9 MMO/L — SIGNIFICANT CHANGE UP (ref 7–14)
APTT BLD: 30.7 SEC — SIGNIFICANT CHANGE UP (ref 27.5–36.3)
APTT BLD: 30.8 SEC — SIGNIFICANT CHANGE UP (ref 27.5–36.3)
APTT BLD: 31.4 SEC — SIGNIFICANT CHANGE UP (ref 27.5–36.3)
APTT BLD: 33.3 SEC — SIGNIFICANT CHANGE UP (ref 27.5–36.3)
APTT BLD: 34.5 SEC — SIGNIFICANT CHANGE UP (ref 27.5–36.3)
AST SERPL-CCNC: 13 U/L — SIGNIFICANT CHANGE UP (ref 4–32)
AST SERPL-CCNC: 14 U/L — SIGNIFICANT CHANGE UP (ref 4–32)
BASE EXCESS BLDA CALC-SCNC: -0.7 MMOL/L — SIGNIFICANT CHANGE UP
BASOPHILS # BLD AUTO: 0.02 K/UL — SIGNIFICANT CHANGE UP (ref 0–0.2)
BASOPHILS NFR BLD AUTO: 0.2 % — SIGNIFICANT CHANGE UP (ref 0–2)
BILIRUB SERPL-MCNC: 0.8 MG/DL — SIGNIFICANT CHANGE UP (ref 0.2–1.2)
BILIRUB SERPL-MCNC: 0.9 MG/DL — SIGNIFICANT CHANGE UP (ref 0.2–1.2)
BUN SERPL-MCNC: 5 MG/DL — LOW (ref 7–23)
CALCIUM SERPL-MCNC: 7.9 MG/DL — LOW (ref 8.4–10.5)
CALCIUM SERPL-MCNC: 8.1 MG/DL — LOW (ref 8.4–10.5)
CALCIUM SERPL-MCNC: 8.2 MG/DL — LOW (ref 8.4–10.5)
CALCIUM SERPL-MCNC: 8.3 MG/DL — LOW (ref 8.4–10.5)
CHLORIDE BLDA-SCNC: 103 MMOL/L — SIGNIFICANT CHANGE UP (ref 96–108)
CHLORIDE SERPL-SCNC: 104 MMOL/L — SIGNIFICANT CHANGE UP (ref 98–107)
CHLORIDE SERPL-SCNC: 105 MMOL/L — SIGNIFICANT CHANGE UP (ref 98–107)
CHLORIDE SERPL-SCNC: 105 MMOL/L — SIGNIFICANT CHANGE UP (ref 98–107)
CHLORIDE SERPL-SCNC: 106 MMOL/L — SIGNIFICANT CHANGE UP (ref 98–107)
CO2 SERPL-SCNC: 19 MMOL/L — LOW (ref 22–31)
CO2 SERPL-SCNC: 20 MMOL/L — LOW (ref 22–31)
CO2 SERPL-SCNC: 21 MMOL/L — LOW (ref 22–31)
CO2 SERPL-SCNC: 21 MMOL/L — LOW (ref 22–31)
CREAT SERPL-MCNC: 0.42 MG/DL — LOW (ref 0.5–1.3)
CREAT SERPL-MCNC: 0.45 MG/DL — LOW (ref 0.5–1.3)
CREAT SERPL-MCNC: 0.45 MG/DL — LOW (ref 0.5–1.3)
CREAT SERPL-MCNC: 0.46 MG/DL — LOW (ref 0.5–1.3)
EOSINOPHIL # BLD AUTO: 0.01 K/UL — SIGNIFICANT CHANGE UP (ref 0–0.5)
EOSINOPHIL NFR BLD AUTO: 0.1 % — SIGNIFICANT CHANGE UP (ref 0–6)
FIBRINOGEN PPP-MCNC: 251 MG/DL — LOW (ref 350–510)
FIBRINOGEN PPP-MCNC: 251.1 MG/DL — LOW (ref 350–510)
FIBRINOGEN PPP-MCNC: 348 MG/DL — LOW (ref 350–510)
FIBRINOGEN PPP-MCNC: 394.8 MG/DL — SIGNIFICANT CHANGE UP (ref 350–510)
GLUCOSE BLDA-MCNC: 94 MG/DL — SIGNIFICANT CHANGE UP (ref 70–99)
GLUCOSE SERPL-MCNC: 109 MG/DL — HIGH (ref 70–99)
GLUCOSE SERPL-MCNC: 88 MG/DL — SIGNIFICANT CHANGE UP (ref 70–99)
GLUCOSE SERPL-MCNC: 97 MG/DL — SIGNIFICANT CHANGE UP (ref 70–99)
GLUCOSE SERPL-MCNC: 97 MG/DL — SIGNIFICANT CHANGE UP (ref 70–99)
HCO3 BLDA-SCNC: 24 MMOL/L — SIGNIFICANT CHANGE UP (ref 22–26)
HCT VFR BLD CALC: 20.6 % — CRITICAL LOW (ref 34.5–45)
HCT VFR BLD CALC: 21.1 % — LOW (ref 34.5–45)
HCT VFR BLD CALC: 22.3 % — LOW (ref 34.5–45)
HCT VFR BLD CALC: 24 % — LOW (ref 34.5–45)
HCT VFR BLD CALC: 24.2 % — LOW (ref 34.5–45)
HCT VFR BLDA CALC: 23.5 % — LOW (ref 34.5–46.5)
HGB BLD-MCNC: 6.7 G/DL — CRITICAL LOW (ref 11.5–15.5)
HGB BLD-MCNC: 7 G/DL — CRITICAL LOW (ref 11.5–15.5)
HGB BLD-MCNC: 7.2 G/DL — LOW (ref 11.5–15.5)
HGB BLD-MCNC: 8.1 G/DL — LOW (ref 11.5–15.5)
HGB BLD-MCNC: 8.1 G/DL — LOW (ref 11.5–15.5)
HGB BLDA-MCNC: 7.5 G/DL — LOW (ref 11.5–15.5)
IMM GRANULOCYTES NFR BLD AUTO: 1.2 % — SIGNIFICANT CHANGE UP (ref 0–1.5)
INR BLD: 1.06 — SIGNIFICANT CHANGE UP (ref 0.88–1.17)
INR BLD: 1.1 — SIGNIFICANT CHANGE UP (ref 0.88–1.17)
INR BLD: 1.14 — SIGNIFICANT CHANGE UP (ref 0.88–1.17)
INR BLD: 1.19 — HIGH (ref 0.88–1.17)
INR BLD: 1.21 — HIGH (ref 0.88–1.17)
LACTATE BLDA-SCNC: 1.5 MMOL/L — SIGNIFICANT CHANGE UP (ref 0.5–2)
LYMPHOCYTES # BLD AUTO: 0.93 K/UL — LOW (ref 1–3.3)
LYMPHOCYTES # BLD AUTO: 9.3 % — LOW (ref 13–44)
MAGNESIUM SERPL-MCNC: 1.2 MG/DL — LOW (ref 1.6–2.6)
MAGNESIUM SERPL-MCNC: 2 MG/DL — SIGNIFICANT CHANGE UP (ref 1.6–2.6)
MCHC RBC-ENTMCNC: 25.4 PG — LOW (ref 27–34)
MCHC RBC-ENTMCNC: 26 PG — LOW (ref 27–34)
MCHC RBC-ENTMCNC: 26.5 PG — LOW (ref 27–34)
MCHC RBC-ENTMCNC: 26.9 PG — LOW (ref 27–34)
MCHC RBC-ENTMCNC: 26.9 PG — LOW (ref 27–34)
MCHC RBC-ENTMCNC: 32.3 % — SIGNIFICANT CHANGE UP (ref 32–36)
MCHC RBC-ENTMCNC: 33 % — SIGNIFICANT CHANGE UP (ref 32–36)
MCHC RBC-ENTMCNC: 33.2 % — SIGNIFICANT CHANGE UP (ref 32–36)
MCHC RBC-ENTMCNC: 33.5 % — SIGNIFICANT CHANGE UP (ref 32–36)
MCHC RBC-ENTMCNC: 33.8 % — SIGNIFICANT CHANGE UP (ref 32–36)
MCV RBC AUTO: 78.7 FL — LOW (ref 80–100)
MCV RBC AUTO: 78.8 FL — LOW (ref 80–100)
MCV RBC AUTO: 79.7 FL — LOW (ref 80–100)
MCV RBC AUTO: 79.9 FL — LOW (ref 80–100)
MCV RBC AUTO: 80.4 FL — SIGNIFICANT CHANGE UP (ref 80–100)
MONOCYTES # BLD AUTO: 1.1 K/UL — HIGH (ref 0–0.9)
MONOCYTES NFR BLD AUTO: 11 % — SIGNIFICANT CHANGE UP (ref 2–14)
NEUTROPHILS # BLD AUTO: 7.84 K/UL — HIGH (ref 1.8–7.4)
NEUTROPHILS NFR BLD AUTO: 78.2 % — HIGH (ref 43–77)
NRBC # FLD: 0 K/UL — SIGNIFICANT CHANGE UP (ref 0–0)
NRBC # FLD: 0.02 K/UL — SIGNIFICANT CHANGE UP (ref 0–0)
PCO2 BLDA: 34 MMHG — SIGNIFICANT CHANGE UP (ref 32–48)
PH BLDA: 7.44 PH — SIGNIFICANT CHANGE UP (ref 7.35–7.45)
PHOSPHATE SERPL-MCNC: 3.5 MG/DL — SIGNIFICANT CHANGE UP (ref 2.5–4.5)
PHOSPHATE SERPL-MCNC: 3.6 MG/DL — SIGNIFICANT CHANGE UP (ref 2.5–4.5)
PLATELET # BLD AUTO: 108 K/UL — LOW (ref 150–400)
PLATELET # BLD AUTO: 110 K/UL — LOW (ref 150–400)
PLATELET # BLD AUTO: 73 K/UL — LOW (ref 150–400)
PLATELET # BLD AUTO: 77 K/UL — LOW (ref 150–400)
PLATELET # BLD AUTO: 86 K/UL — LOW (ref 150–400)
PMV BLD: 10.2 FL — SIGNIFICANT CHANGE UP (ref 7–13)
PMV BLD: 10.2 FL — SIGNIFICANT CHANGE UP (ref 7–13)
PMV BLD: 10.9 FL — SIGNIFICANT CHANGE UP (ref 7–13)
PMV BLD: 11 FL — SIGNIFICANT CHANGE UP (ref 7–13)
PMV BLD: 11.1 FL — SIGNIFICANT CHANGE UP (ref 7–13)
PO2 BLDA: 93 MMHG — SIGNIFICANT CHANGE UP (ref 83–108)
POTASSIUM BLDA-SCNC: 3.4 MMOL/L — SIGNIFICANT CHANGE UP (ref 3.4–4.5)
POTASSIUM SERPL-MCNC: 3.3 MMOL/L — LOW (ref 3.5–5.3)
POTASSIUM SERPL-MCNC: 3.4 MMOL/L — LOW (ref 3.5–5.3)
POTASSIUM SERPL-MCNC: 3.6 MMOL/L — SIGNIFICANT CHANGE UP (ref 3.5–5.3)
POTASSIUM SERPL-MCNC: 3.6 MMOL/L — SIGNIFICANT CHANGE UP (ref 3.5–5.3)
POTASSIUM SERPL-SCNC: 3.3 MMOL/L — LOW (ref 3.5–5.3)
POTASSIUM SERPL-SCNC: 3.4 MMOL/L — LOW (ref 3.5–5.3)
POTASSIUM SERPL-SCNC: 3.6 MMOL/L — SIGNIFICANT CHANGE UP (ref 3.5–5.3)
POTASSIUM SERPL-SCNC: 3.6 MMOL/L — SIGNIFICANT CHANGE UP (ref 3.5–5.3)
PROT SERPL-MCNC: 4.3 G/DL — LOW (ref 6–8.3)
PROT SERPL-MCNC: 4.7 G/DL — LOW (ref 6–8.3)
PROTHROM AB SERPL-ACNC: 12.1 SEC — SIGNIFICANT CHANGE UP (ref 9.8–13.1)
PROTHROM AB SERPL-ACNC: 12.3 SEC — SIGNIFICANT CHANGE UP (ref 9.8–13.1)
PROTHROM AB SERPL-ACNC: 13 SEC — SIGNIFICANT CHANGE UP (ref 9.8–13.1)
PROTHROM AB SERPL-ACNC: 13.3 SEC — HIGH (ref 9.8–13.1)
PROTHROM AB SERPL-ACNC: 13.5 SEC — HIGH (ref 9.8–13.1)
RBC # BLD: 2.58 M/UL — LOW (ref 3.8–5.2)
RBC # BLD: 2.64 M/UL — LOW (ref 3.8–5.2)
RBC # BLD: 2.83 M/UL — LOW (ref 3.8–5.2)
RBC # BLD: 3.01 M/UL — LOW (ref 3.8–5.2)
RBC # BLD: 3.01 M/UL — LOW (ref 3.8–5.2)
RBC # FLD: 19.3 % — HIGH (ref 10.3–14.5)
RBC # FLD: 19.3 % — HIGH (ref 10.3–14.5)
RBC # FLD: 19.6 % — HIGH (ref 10.3–14.5)
RBC # FLD: 20 % — HIGH (ref 10.3–14.5)
RBC # FLD: 20 % — HIGH (ref 10.3–14.5)
SAO2 % BLDA: 97.8 % — SIGNIFICANT CHANGE UP (ref 95–99)
SODIUM BLDA-SCNC: 135 MMOL/L — LOW (ref 136–146)
SODIUM SERPL-SCNC: 136 MMOL/L — SIGNIFICANT CHANGE UP (ref 135–145)
WBC # BLD: 10.02 K/UL — SIGNIFICANT CHANGE UP (ref 3.8–10.5)
WBC # BLD: 11.25 K/UL — HIGH (ref 3.8–10.5)
WBC # BLD: 7.97 K/UL — SIGNIFICANT CHANGE UP (ref 3.8–10.5)
WBC # BLD: 8 K/UL — SIGNIFICANT CHANGE UP (ref 3.8–10.5)
WBC # BLD: 8.62 K/UL — SIGNIFICANT CHANGE UP (ref 3.8–10.5)
WBC # FLD AUTO: 10.02 K/UL — SIGNIFICANT CHANGE UP (ref 3.8–10.5)
WBC # FLD AUTO: 11.25 K/UL — HIGH (ref 3.8–10.5)
WBC # FLD AUTO: 7.97 K/UL — SIGNIFICANT CHANGE UP (ref 3.8–10.5)
WBC # FLD AUTO: 8 K/UL — SIGNIFICANT CHANGE UP (ref 3.8–10.5)
WBC # FLD AUTO: 8.62 K/UL — SIGNIFICANT CHANGE UP (ref 3.8–10.5)

## 2019-12-24 PROCEDURE — 99291 CRITICAL CARE FIRST HOUR: CPT

## 2019-12-24 RX ORDER — SODIUM CHLORIDE 9 MG/ML
500 INJECTION, SOLUTION INTRAVENOUS ONCE
Refills: 0 | Status: COMPLETED | OUTPATIENT
Start: 2019-12-24 | End: 2019-12-24

## 2019-12-24 RX ORDER — CHLORHEXIDINE GLUCONATE 213 G/1000ML
1 SOLUTION TOPICAL
Refills: 0 | Status: DISCONTINUED | OUTPATIENT
Start: 2019-12-24 | End: 2019-12-26

## 2019-12-24 RX ORDER — SODIUM CHLORIDE 9 MG/ML
1000 INJECTION, SOLUTION INTRAVENOUS
Refills: 0 | Status: DISCONTINUED | OUTPATIENT
Start: 2019-12-24 | End: 2019-12-26

## 2019-12-24 RX ORDER — POTASSIUM CHLORIDE 20 MEQ
40 PACKET (EA) ORAL EVERY 4 HOURS
Refills: 0 | Status: COMPLETED | OUTPATIENT
Start: 2019-12-24 | End: 2019-12-24

## 2019-12-24 RX ORDER — MAGNESIUM SULFATE 500 MG/ML
2 VIAL (ML) INJECTION ONCE
Refills: 0 | Status: COMPLETED | OUTPATIENT
Start: 2019-12-24 | End: 2019-12-24

## 2019-12-24 RX ORDER — OXYTOCIN 10 UNIT/ML
333.33 VIAL (ML) INJECTION
Qty: 20 | Refills: 0 | Status: DISCONTINUED | OUTPATIENT
Start: 2019-12-24 | End: 2019-12-24

## 2019-12-24 RX ORDER — HYDROMORPHONE HYDROCHLORIDE 2 MG/ML
1 INJECTION INTRAMUSCULAR; INTRAVENOUS; SUBCUTANEOUS
Refills: 0 | Status: DISCONTINUED | OUTPATIENT
Start: 2019-12-24 | End: 2019-12-24

## 2019-12-24 RX ORDER — OXYTOCIN 10 UNIT/ML
41.67 VIAL (ML) INJECTION
Qty: 20 | Refills: 0 | Status: DISCONTINUED | OUTPATIENT
Start: 2019-12-24 | End: 2019-12-26

## 2019-12-24 RX ORDER — OXYTOCIN 10 UNIT/ML
333.33 VIAL (ML) INJECTION
Qty: 20 | Refills: 0 | Status: COMPLETED | OUTPATIENT
Start: 2019-12-24 | End: 2019-12-24

## 2019-12-24 RX ORDER — ACETAMINOPHEN 500 MG
1000 TABLET ORAL ONCE
Refills: 0 | Status: COMPLETED | OUTPATIENT
Start: 2019-12-24 | End: 2019-12-24

## 2019-12-24 RX ORDER — OXYCODONE HYDROCHLORIDE 5 MG/1
5 TABLET ORAL ONCE
Refills: 0 | Status: DISCONTINUED | OUTPATIENT
Start: 2019-12-24 | End: 2019-12-24

## 2019-12-24 RX ORDER — HYDROMORPHONE HYDROCHLORIDE 2 MG/ML
0.5 INJECTION INTRAMUSCULAR; INTRAVENOUS; SUBCUTANEOUS
Refills: 0 | Status: DISCONTINUED | OUTPATIENT
Start: 2019-12-24 | End: 2019-12-24

## 2019-12-24 RX ADMIN — SODIUM CHLORIDE 1000 MILLILITER(S): 9 INJECTION, SOLUTION INTRAVENOUS at 02:10

## 2019-12-24 RX ADMIN — SODIUM CHLORIDE 125 MILLILITER(S): 9 INJECTION, SOLUTION INTRAVENOUS at 09:13

## 2019-12-24 RX ADMIN — Medication 125 MILLIUNIT(S)/MIN: at 02:32

## 2019-12-24 RX ADMIN — Medication 100 MILLIGRAM(S): at 20:00

## 2019-12-24 RX ADMIN — Medication 1000 MILLIUNIT(S)/MIN: at 19:16

## 2019-12-24 RX ADMIN — Medication 100 MILLIGRAM(S): at 03:56

## 2019-12-24 RX ADMIN — HYDROMORPHONE HYDROCHLORIDE 0.5 MILLIGRAM(S): 2 INJECTION INTRAMUSCULAR; INTRAVENOUS; SUBCUTANEOUS at 02:34

## 2019-12-24 RX ADMIN — Medication 40 MILLIEQUIVALENT(S): at 18:07

## 2019-12-24 RX ADMIN — Medication 50 GRAM(S): at 13:10

## 2019-12-24 RX ADMIN — Medication 400 MILLIGRAM(S): at 11:00

## 2019-12-24 RX ADMIN — Medication 1000 MILLIGRAM(S): at 00:30

## 2019-12-24 RX ADMIN — HYDROMORPHONE HYDROCHLORIDE 1 MILLIGRAM(S): 2 INJECTION INTRAMUSCULAR; INTRAVENOUS; SUBCUTANEOUS at 08:20

## 2019-12-24 RX ADMIN — Medication 1000 MILLIGRAM(S): at 11:30

## 2019-12-24 RX ADMIN — OXYCODONE HYDROCHLORIDE 5 MILLIGRAM(S): 5 TABLET ORAL at 20:22

## 2019-12-24 RX ADMIN — SODIUM CHLORIDE 1000 MILLILITER(S): 9 INJECTION, SOLUTION INTRAVENOUS at 01:10

## 2019-12-24 RX ADMIN — Medication 400 MILLIGRAM(S): at 00:05

## 2019-12-24 RX ADMIN — HYDROMORPHONE HYDROCHLORIDE 0.5 MILLIGRAM(S): 2 INJECTION INTRAMUSCULAR; INTRAVENOUS; SUBCUTANEOUS at 03:30

## 2019-12-24 RX ADMIN — Medication 1000 MILLIGRAM(S): at 11:55

## 2019-12-24 RX ADMIN — Medication 50 GRAM(S): at 11:55

## 2019-12-24 RX ADMIN — Medication 975 MILLIGRAM(S): at 22:01

## 2019-12-24 RX ADMIN — OXYCODONE HYDROCHLORIDE 5 MILLIGRAM(S): 5 TABLET ORAL at 20:52

## 2019-12-24 RX ADMIN — Medication 100 MILLIGRAM(S): at 12:12

## 2019-12-24 RX ADMIN — Medication 1000 MILLIGRAM(S): at 17:35

## 2019-12-24 RX ADMIN — Medication 40 MILLIEQUIVALENT(S): at 22:00

## 2019-12-24 RX ADMIN — HYDROMORPHONE HYDROCHLORIDE 1 MILLIGRAM(S): 2 INJECTION INTRAMUSCULAR; INTRAVENOUS; SUBCUTANEOUS at 08:18

## 2019-12-24 RX ADMIN — Medication 400 MILLIGRAM(S): at 17:20

## 2019-12-24 NOTE — CHART NOTE - NSCHARTNOTEFT_GEN_A_CORE
Ob  note    Went to evaluate pt after RN reported BPs 90/50's with A-line.  When I went to evaluate pt she denied HA, CP, SOB, palpitations or feeling dizzy or lightheaded.  Pt alert and oriented.  HR 90's  Bakri output decreased 20cc/hr.  Urine output 80cc/hr.  Pt reports feeling a "fullness" in upper abdomen, on exam uterine fundus was what she was feeling 3+ above umbilicus (unchanged).    Will send CBC and coags now.  Likely equilibrating from PPH if hct trending down will likely transfuse another uprbcs.  Will continue to monitor closely.      Vicenta Hunter MD Ob  note    Went to evaluate pt after RN reported BPs 90/50's with A-line.  When I went to evaluate pt she denied HA, CP, SOB, palpitations or feeling dizzy or lightheaded.  Pt alert and oriented.  HR 90's  Bakri output decreased 20cc/hr.  Urine output 80cc/hr.  Pt reports feeling a "fullness" in upper abdomen, on exam uterine fundus was what she was feeling 3+ above umbilicus (unchanged).    Will send CBC and coags now.  Likely equilibrating from PPH if hct trending down will likely transfuse another uprbcs. SICU consult called to evaluate for higher level of care.      Vicenta Hunter MD

## 2019-12-24 NOTE — PROGRESS NOTE ADULT - SUBJECTIVE AND OBJECTIVE BOX
POST ANESTHESIA EVALUATION & PACU DISCHARGE NOTE    34y Female POSTOP DAY 1 S/P     MENTAL STATUS: Patient participation [ x ] Awake     [  ] Arousable     [  ] Sedated    AIRWAY PATENCY: [ x ] Satisfactory  [  ] Other:     Vital Signs Last 24 Hrs  T(C): 37.6 (24 Dec 2019 08:53), Max: 37.6 (24 Dec 2019 08:53)  T(F): 99.6 (24 Dec 2019 08:53), Max: 99.6 (24 Dec 2019 08:53)  HR: 115 (24 Dec 2019 09:03) (71 - 121)  BP: 98/59 (24 Dec 2019 08:00) (94/49 - 139/81)  BP(mean): 59 (24 Dec 2019 04:00) (49 - 93)  RR: 29 (24 Dec 2019 09:03) (16 - 32)  SpO2: 98% (24 Dec 2019 09:03) (97% - 100%)  I&O's Summary    23 Dec 2019 07:01  -  24 Dec 2019 07:00  --------------------------------------------------------  IN: 22982.5 mL / OUT: 4073 mL / NET: 6554.5 mL    24 Dec 2019 07:01  -  24 Dec 2019 10:10  --------------------------------------------------------  IN: 125 mL / OUT: 60 mL / NET: 65 mL          NAUSEA/ VOMITTING:  [ x ] NONE  [  ] CONTROLLED [  ] OTHER     PAIN: [ x ] CONTROLLED WITH CURRENT REGIMEN  [  ] OTHER    Comments: Pt's overnight coarse noted via reports from night staff. IV access limited and previously placed IV infiltrated. Pt initially refused CVL. Attempted sono guidance for PIV access under sterile conditions in RUE unsuccessful x 2 attempts and aborted.  SICU now accepting pt, and pt now reconsidering CVL. Care transferred to SICU.

## 2019-12-24 NOTE — PATIENT PROFILE ADULT - ARE ANY OF THE ITEMS ON THE CHART
yes I have personally performed a face to face diagnostic evaluation on this patient. I have reviewed the ACP note and agree with the history, exam and plan of care, except as noted.

## 2019-12-24 NOTE — PATIENT PROFILE ADULT - CAREGIVER ADDRESS
Spf (Optional): hydratint Serum Type (Optional): Luzern rehydrate, FCB, PCA HABS, eye luxe, lip balm Vacuum Pressure: 30 Extraction Method: extractor Facial Steaming: steamed Consent: No consent required for this treatment. Indication: skin rejuvenation Treatment Number: 1 Prefacial Cleansing: Luzern products used; cleanse, microdermabrasion, pumpkin enzyme, extractions, masque nuit and previously stated serums, moisturizer. Number Of Passes: 2 Post-Care Instructions: Pt may resume normal home skincare routine. Sun protection emphasized. All pt concerns and questions addressed and pt verbalized understanding. Moisturizer Type (Optional): intensive Prep Text: The patient's skin was cleansed prior to tx. Detail Level: Zone Price (Use Numbers Only, No Special Characters Or $): 100 Endpoint: mild erythema Brown RODRIGUEZ

## 2019-12-24 NOTE — CHART NOTE - NSCHARTNOTEFT_GEN_A_CORE
R4 Note    Pt evaluated at bedside for BPs on A-line noted to be 80s/40s. Pt laying comfortably in bed. Mentating well. C/o tenderness in abdomen. Denies any SOB, CP, n/v, fever/chills.    Vital Signs Last 24 Hrs  T(C): 36.9 (24 Dec 2019 01:30), Max: 37 (23 Dec 2019 21:19)  T(F): 98.4 (24 Dec 2019 01:30), Max: 98.6 (23 Dec 2019 21:19)  HR: 108 (24 Dec 2019 02:30) (71 - 108)  BP: 117/71 (23 Dec 2019 22:15) (97/49 - 139/81)  BP(mean): 81 (23 Dec 2019 22:15) (49 - 93)  RR: 22 (24 Dec 2019 02:30) (16 - 22)  SpO2: 100% (24 Dec 2019 02:30) (97% - 100%)    Gen: NAD, AAOx3  CV: RRR  Abd: soft, mildly distended, tender to palpation at fundus  Gyn: dark red blood in Bakri,  in place, no bleeding through packing  Ext: NTB/L R4 Note    Pt evaluated at bedside for BPs on A-line noted to be 80s/40s. Pt laying comfortably in bed. Mentating well. C/o tenderness in abdomen. Denies any SOB, CP, n/v, fever/chills.    Vital Signs Last 24 Hrs  T(C): 36.9 (24 Dec 2019 01:30), Max: 37 (23 Dec 2019 21:19)  T(F): 98.4 (24 Dec 2019 01:30), Max: 98.6 (23 Dec 2019 21:19)  HR: 108 (24 Dec 2019 02:30) (71 - 108)  BP: 117/71 (23 Dec 2019 22:15) (97/49 - 139/81)  BP(mean): 81 (23 Dec 2019 22:15) (49 - 93)  RR: 22 (24 Dec 2019 02:30) (16 - 22)  SpO2: 100% (24 Dec 2019 02:30) (97% - 100%)    Gen: NAD, AAOx3  CV: RRR  Abd: soft, mildly distended, tender to palpation at fundus  Gyn: dark red blood in Bakri,  in place, no bleeding through packing  Ext: NTB/L    BSS: Bakri in place, FAST neg                          7.0    10.02 )-----------( 108      ( 24 Dec 2019 03:54 )             21.1     PT/INR - ( 24 Dec 2019 03:54 )   PT: 13.5 SEC;   INR: 1.21          PTT - ( 24 Dec 2019 03:54 )  PTT:33.3 SEC    Fibrinogen 251      Plan:  -1L LR bolus  -plan to transfuse another 2uPRBCs, 1FFP  -SICU consult  -continue to monitor closely    Dr. Corcoran and Dr. Hunter at bedside during evaluation  marivel [email or web address]

## 2019-12-24 NOTE — CONSULT NOTE ADULT - ATTENDING COMMENTS
Agree with notes of health care providers on my service.  I have personally examined the patient, reviewed data and laboratory tests/x-rays and all pertinent electronic images and an assessment and plan is documented.   The patient is a critical care patient with life threatening hemodynamic and metabolic instability in SICU.  Risk benefit analyzes discussed.  The documentation of the total time spent 55-75 minutes ( 0800Hrs-0920Hrs in AM ).   34F  s/p  complicated by post-partum hemorrhage s/p ROR w/o signs of active bleeding and bakri balloon placement. Hypotension post- procedure   EXAM  NEUROLOGY  Exam: Somewhat lethargic. NAD, alert, oriented x 3, no focal deficits.  HEENT  Exam: Normocephalic, atraumatic.  EOMI  RESPIRATORY  Exam: Lungs clear to auscultation,   CARDIOVASCULAR  Exam: S1, S2.  Regular rate and rhythm.   GI/NUTRITION  Exam: Abdomen soft, appropriately tender to palpation.    PLAN:  Neurologic:   -PRN pain control Tylenol, Dilaudid  Respiratory:   -Stable on RA, monitor SpO2  Cardiovascular:   -Hypovolemic s/p post-partum hemorrhage with hypotension and tachycardia, with MAPs~65mmHg.  Continue rescusitation with PRBCs and Crystalloid.  Gastrointestinal/Nutrition:   -NPO due to possibility of ROR  Renal/Genitourinary:   -Tlobert for strict I/Os, monitor UOP  -Monitor electrolytes, replete as needed  - Hypomagnesemic, s/p repletions; will trend  Hematologic:   -EBL 3L s/p post-partum hemorrhage. Most recent H/H~. Continue resuscitation with PRBCs and Crystalloid.   -Repeat serial H/Hs, transfuse for Hb<7  - will obtain q6 labs to trend crit  Infectious Disease:   -On Ancef while Bakri balloon in place  Endocrine:   -No Active Issues    Disposition: SICU    Critical Care Diagnoses: Post partum hemorrhage, s/p c section, hypotension post-procedure

## 2019-12-24 NOTE — CHART NOTE - NSCHARTNOTEFT_GEN_A_CORE
P{t S/P PP hemorrhage with Bakri balloon in place    Bakri output 20 cc in last hour (output is decreasing)  U/O however is 20 cc for last hour  Urine is concentrated    Incision - dry/clean/intact  HCT stable at 24%    Will    1) LR Bolus 500 cc x 1     Will follow    IGOR Gallo

## 2019-12-24 NOTE — PROGRESS NOTE ADULT - SUBJECTIVE AND OBJECTIVE BOX
R3 Postpartum Note    35yo s/p scheduled repeat C/S, QBL ____, c/b PPH w Bakhri, VPx3 placed,     S: Patient seen and examined at bedside, no acute overnight events. No acute complaints.     Denies CP, SOB, N/V, fevers, and chills.  Tolbert and bakhri in situ.      O:   Vital Signs Last 24 Hours  T(C): 36.9 (12-24-19 @ 01:30), Max: 37 (12-23-19 @ 21:19)  HR: 108 (12-24-19 @ 02:30) (71 - 108)  BP: 117/71 (12-23-19 @ 22:15) (97/49 - 139/81)  RR: 22 (12-24-19 @ 02:30) (16 - 22)  SpO2: 100% (12-24-19 @ 02:30) (97% - 100%)        Physical Exam:  Cardio: nl S1/S2, RRR  Pulm: CTABL  General: NAD  Abdomen: Soft, non-tender, uterus firm, non distended  Incision: Pfannenstiel incision, w dressing intact, clean and dry  Ext: No pain or swelling    Labs:             8.1    11.25 )-----------( 110      ( 12-24 @ 00:30 )             24.0     12-24 @ 00:30    136  |  105  |  5   ----------------------------<  97  3.6   |  20  |  0.42    Ca    8.3      12-24 @ 00:30    TPro  4.7  /  Alb  2.7  /  TBili  0.9  /  DBili  x   /  AST  13  /  ALT  5   /  AlkPhos  111  12-24 @ 00:30    PT/INR - ( 12-24 @ 00:30 )   PT: 13.0 SEC;   INR: 1.14     PTT - ( 12-24 @ 00:30 )  PTT:34.5 SEC    Uric Acid: (12-24 @ 00:30)  --       Fibrinogen: (12-24 @ 00:30)  251.0    LDH: (12-24 @ 00:30)  --         MEDICATIONS  (STANDING):  acetaminophen   Tablet .. 975 milliGRAM(s) Oral <User Schedule>  ceFAZolin   IVPB 2000 milliGRAM(s) IV Intermittent every 8 hours  diphtheria/tetanus/pertussis (acellular) Vaccine (ADAcel) 0.5 milliLiter(s) IntraMuscular once  heparin  Injectable 5000 Unit(s) SubCutaneous every 12 hours  ibuprofen  Tablet. 600 milliGRAM(s) Oral every 6 hours  influenza   Vaccine 0.5 milliLiter(s) IntraMuscular once  lactated ringers. 1000 milliLiter(s) (75 mL/Hr) IV Continuous <Continuous>  oxytocin Infusion 41.667 milliUNIT(s)/Min (125 mL/Hr) IV Continuous <Continuous>    MEDICATIONS  (PRN):  diphenhydrAMINE 25 milliGRAM(s) Oral every 6 hours PRN Itching  glycerin Suppository - Adult 1 Suppository(s) Rectal at bedtime PRN Constipation  HYDROmorphone  Injectable 0.5 milliGRAM(s) IV Push every 10 minutes PRN Moderate Pain (4 - 6)  lanolin Ointment 1 Application(s) Topical every 6 hours PRN Sore Nipples  magnesium hydroxide Suspension 30 milliLiter(s) Oral two times a day PRN Constipation  oxyCODONE    IR 5 milliGRAM(s) Oral every 3 hours PRN Moderate to Severe Pain (4-10)  oxyCODONE    IR 5 milliGRAM(s) Oral once PRN Moderate to Severe Pain (4-10)  simethicone 80 milliGRAM(s) Chew every 4 hours PRN Gas R3 Postpartum Note, HD#2, POD#1    33yo s/p scheduled repeat C/S, , c/b PPH 1940, Bakhri and VPx3 placed.    S: Patient seen and examined at bedside, no acute overnight events. No acute complaints.     Denies CP, N/V, fevers, and chills.  Endorses some lightheadedness.  Tolbert and bakhri in situ.      O:   Vital Signs Last 24 Hours  T(C): 36.9 (12-24-19 @ 01:30), Max: 37 (12-23-19 @ 21:19)  HR: 108 (12-24-19 @ 02:30) (71 - 108)  BP: 117/71 (12-23-19 @ 22:15) (97/49 - 139/81)  RR: 22 (12-24-19 @ 02:30) (16 - 22)  SpO2: 100% (12-24-19 @ 02:30) (97% - 100%)        Physical Exam:  Cardio: nl S1/S2, RRR  Pulm: CTABL  General: NAD  Abdomen: Soft, non-tender, uterus firm, mildly distended; uterus 3cm above umbilicus  Incision: Pfannenstiel incision, w dressing intact, clean and dry  Tolbert: in situ  Bakhri: in situ, w  in place  Ext: No pain or swelling    Labs:             8.1    11.25 )-----------( 110      ( 12-24 @ 00:30 )             24.0     12-24 @ 00:30    136  |  105  |  5   ----------------------------<  97  3.6   |  20  |  0.42    Ca    8.3      12-24 @ 00:30    TPro  4.7  /  Alb  2.7  /  TBili  0.9  /  DBili  x   /  AST  13  /  ALT  5   /  AlkPhos  111  12-24 @ 00:30    PT/INR - ( 12-24 @ 00:30 )   PT: 13.0 SEC;   INR: 1.14     PTT - ( 12-24 @ 00:30 )  PTT:34.5 SEC    Uric Acid: (12-24 @ 00:30)  --       Fibrinogen: (12-24 @ 00:30)  251.0    LDH: (12-24 @ 00:30)  --         MEDICATIONS  (STANDING):  acetaminophen   Tablet .. 975 milliGRAM(s) Oral <User Schedule>  ceFAZolin   IVPB 2000 milliGRAM(s) IV Intermittent every 8 hours  diphtheria/tetanus/pertussis (acellular) Vaccine (ADAcel) 0.5 milliLiter(s) IntraMuscular once  heparin  Injectable 5000 Unit(s) SubCutaneous every 12 hours  ibuprofen  Tablet. 600 milliGRAM(s) Oral every 6 hours  influenza   Vaccine 0.5 milliLiter(s) IntraMuscular once  lactated ringers. 1000 milliLiter(s) (75 mL/Hr) IV Continuous <Continuous>  oxytocin Infusion 41.667 milliUNIT(s)/Min (125 mL/Hr) IV Continuous <Continuous>    MEDICATIONS  (PRN):  diphenhydrAMINE 25 milliGRAM(s) Oral every 6 hours PRN Itching  glycerin Suppository - Adult 1 Suppository(s) Rectal at bedtime PRN Constipation  HYDROmorphone  Injectable 0.5 milliGRAM(s) IV Push every 10 minutes PRN Moderate Pain (4 - 6)  lanolin Ointment 1 Application(s) Topical every 6 hours PRN Sore Nipples  magnesium hydroxide Suspension 30 milliLiter(s) Oral two times a day PRN Constipation  oxyCODONE    IR 5 milliGRAM(s) Oral every 3 hours PRN Moderate to Severe Pain (4-10)  oxyCODONE    IR 5 milliGRAM(s) Oral once PRN Moderate to Severe Pain (4-10)  simethicone 80 milliGRAM(s) Chew every 4 hours PRN Gas R3 Postpartum Note, HD#2, POD#1    35yo s/p scheduled repeat C/S, , c/b PPH 1940, Bakhri and VPx3 placed.    S: Patient seen and examined at bedside, no acute overnight events. No acute complaints.     Denies CP, N/V, fevers, and chills.  Endorses some lightheadedness.  Tolbert and bakhri in situ.      O:   Vital Signs Last 24 Hours  T(C): 36.9 (12-24-19 @ 01:30), Max: 37 (12-23-19 @ 21:19)  HR: 108 (12-24-19 @ 02:30) (71 - 108)  BP: 117/71 (12-23-19 @ 22:15) (97/49 - 139/81)  RR: 22 (12-24-19 @ 02:30) (16 - 22)  SpO2: 100% (12-24-19 @ 02:30) (97% - 100%)        Physical Exam:  Cardio: nl S1/S2, RRR  Pulm: CTABL  General: NAD  Abdomen: Soft, non-tender, uterus firm, mildly distended; uterus 3cm above umbilicus  Incision: Pfannenstiel incision, w dressing intact, clean and dry  Tolbert: in situ  Bakhri: in situ, w  in place  Ext: No pain or swelling    I&O's Detail    23 Dec 2019 07:01  -  24 Dec 2019 05:08  --------------------------------------------------------  IN:    Lactated Ringers IV Bolus: 5000 mL    lactated ringers.: 450 mL    lactated ringers.: 110 mL    Other: 2000 mL    oxytocin Infusion: 185 mL    oxytocin Infusion: 562.5 mL    oxytocin Infusion: 375 mL    Packed Red Blood Cells: 620 mL  Total IN: 9302.5 mL    OUT:    Estimated Blood Loss: 2757 mL    Indwelling Catheter - Urethral: 631 mL    Other: 570 mL  Total OUT: 3958 mL    Total NET: 5344.5 mL            Labs:             8.1    11.25 )-----------( 110      ( 12-24 @ 00:30 )             24.0     12-24 @ 00:30    136  |  105  |  5   ----------------------------<  97  3.6   |  20  |  0.42    Ca    8.3      12-24 @ 00:30    TPro  4.7  /  Alb  2.7  /  TBili  0.9  /  DBili  x   /  AST  13  /  ALT  5   /  AlkPhos  111  12-24 @ 00:30    PT/INR - ( 12-24 @ 00:30 )   PT: 13.0 SEC;   INR: 1.14     PTT - ( 12-24 @ 00:30 )  PTT:34.5 SEC    Uric Acid: (12-24 @ 00:30)  --       Fibrinogen: (12-24 @ 00:30)  251.0    LDH: (12-24 @ 00:30)  --         MEDICATIONS  (STANDING):  acetaminophen   Tablet .. 975 milliGRAM(s) Oral <User Schedule>  ceFAZolin   IVPB 2000 milliGRAM(s) IV Intermittent every 8 hours  diphtheria/tetanus/pertussis (acellular) Vaccine (ADAcel) 0.5 milliLiter(s) IntraMuscular once  heparin  Injectable 5000 Unit(s) SubCutaneous every 12 hours  ibuprofen  Tablet. 600 milliGRAM(s) Oral every 6 hours  influenza   Vaccine 0.5 milliLiter(s) IntraMuscular once  lactated ringers. 1000 milliLiter(s) (75 mL/Hr) IV Continuous <Continuous>  oxytocin Infusion 41.667 milliUNIT(s)/Min (125 mL/Hr) IV Continuous <Continuous>    MEDICATIONS  (PRN):  diphenhydrAMINE 25 milliGRAM(s) Oral every 6 hours PRN Itching  glycerin Suppository - Adult 1 Suppository(s) Rectal at bedtime PRN Constipation  HYDROmorphone  Injectable 0.5 milliGRAM(s) IV Push every 10 minutes PRN Moderate Pain (4 - 6)  lanolin Ointment 1 Application(s) Topical every 6 hours PRN Sore Nipples  magnesium hydroxide Suspension 30 milliLiter(s) Oral two times a day PRN Constipation  oxyCODONE    IR 5 milliGRAM(s) Oral every 3 hours PRN Moderate to Severe Pain (4-10)  oxyCODONE    IR 5 milliGRAM(s) Oral once PRN Moderate to Severe Pain (4-10)  simethicone 80 milliGRAM(s) Chew every 4 hours PRN Gas

## 2019-12-24 NOTE — CHART NOTE - NSCHARTNOTEFT_GEN_A_CORE
Attending Note    Patient evaluated at bedside. Patient offers no complaints. Denies shortness of breath, chest pain palpitations, dizziness, etc. Patient reports incisional discomfort and uterine cramping improved  VS T 99.3  p 111 R 27 /70 O sat 99% RA  LR @ 125ml/hr  UO 175ml since 7Am Bakri Balloon 45ml since 7Am ( 5-10cc/hr)    Heentn l  CV sinus tachycardia  Abd fundus firm above umbilicus  Ext 2+ edema  neuro AAOx 3    A: POD#1 s/p RCS, PPH, Bakri balloon, s/p 4units PRBC's  P: F/U 2:30pm labs     Continue IVF and antibiotics     Continue observation in SICU     Bakri to be removed 7pm tonight ( 24hrs)     Plan of care discussed with patient     I explained to patient in detail if she remains stable with good UO, stable labs we will continue to observe. Otherwise re-op/ surgical intervention may be warrranted  All questions and concerns addressed with  at bedside  Mary

## 2019-12-24 NOTE — CHART NOTE - NSCHARTNOTEFT_GEN_A_CORE
Att MFM  Pat S/P C/S and a PPH total Bl loss aprox 2,000cc (marquita of 12/23)  Received a total of 4u RBC and 2u FFP and Bakri baloon placed in the OR  In the AM sl tachycardic SBP  U/O 30-35cc hr  A FAST exam showed free fluid in the peritoneal cavity  Minimal drainage from the Bakri  Rpt Labs around 8AM Hct 23% sl elevated PT and INR  Pat being monitored in the SICU  Plan Repeat Labs at this time; If stable will continue med magement         If significant drop in HCt will consider re-op

## 2019-12-24 NOTE — PROGRESS NOTE ADULT - ASSESSMENT
35yo s/p scheduled repeat C/S, , c/b PPH 1940, Bakhri and VPx3 placed.  HD#2, POD#1 35yo s/p scheduled repeat C/S, , c/b PPH 1940, Bakhri and VPx3 placed.  HD#2, POD#1  No signs of active bleeding currently, likely hypotension and tachycarcia due to underresussitation, would not return to OR at this time.  Giving 2 additional units, fluid bolus, continue close monitoring.

## 2019-12-24 NOTE — PROGRESS NOTE ADULT - SUBJECTIVE AND OBJECTIVE BOX
R3 Postpartum Note, HD#2, POD#1    35yo  s/p scheduled repeat C/S, QBL 817cc, c/b PPH of 1940cc POD#0, s/p Bakhri and VPx3, s/p 6u pRBC and 2u FFP.  Admitted to SICU for tachycardia and hypotension.    S: Patient seen and examined at bedside.  No acute complaints.     Denies CP, SOB, N/V, fevers, and chills.  Bakhri output 3-5cc per hour since noon, total output over 12 hours 71cc.  Tolbert in situ.    Bakhri balloon w minimal output, removed.  ~420cc fluid from balloon.  Vaginal packing x3 removed, not saturated.  No bleeding noted after Bakhri removal.      O:   Vital Signs Last 24 Hours  T(C): 37.7 (19 @ 16:00), Max: 37.9 (19 @ 10:00)  HR: 104 (19 @ 19:20) (79 - 125)  BP: 116/67 (19 @ 19:00) (94/49 - 147/65)  RR: 24 (19 @ 19:20) (16 - 32)  SpO2: 99% (19 @ 19:20) (95% - 100%)      Physical Exam:  Cardio: nl S1/S2, RRR  Pulm: CTABL  General: NAD  Abdomen: Soft, non-tender, uterus firm, non distended  Incision: Pfannenstiel incision, w dressing intact, clean and dry  Tolbert: in situ draining clear yellow urine  Bakhri: w dark red blood  Ext: No pain or swelling, SCDs in place      I&O's Detail    23 Dec 2019 07:01  -  24 Dec 2019 07:00  --------------------------------------------------------  IN:    Lactated Ringers IV Bolus: 5000 mL    lactated ringers.: 487.5 mL    lactated ringers.: 110 mL    Other: 2000 mL    oxytocin Infusion: 185 mL    oxytocin Infusion: 562.5 mL    oxytocin Infusion: 437.5 mL    Packed Red Blood Cells: 1240 mL    Plasma: 605 mL  Total IN: 88748.5 mL    OUT:    Estimated Blood Loss: 2757 mL    Indwelling Catheter - Urethral: 706 mL    Other: 610 mL  Total OUT: 4073 mL    Total NET: 6554.5 mL      24 Dec 2019 07:  -  24 Dec 2019 19:51  --------------------------------------------------------  IN:    IV PiggyBack: 300 mL    lactated ringers.: 1125 mL  Total IN: 1425 mL    OUT:    Indwelling Catheter - Urethral: 640 mL    Other: 71 mL  Total OUT: 711 mL    Total NET: 714 mL              Labs:             6.7    7.97  )-----------( 77       (  @ 14:10 )             20.6      @ 14:10    136  |  106  |  5   ----------------------------<  88  3.3   |  21  |  0.46    Ca    8.2       @ 14:10  Phos  3.5      @ 14:10  Mg     2.0      @ 14:10    TPro  4.3  /  Alb  2.6  /  TBili  0.8  /  DBili  x   /  AST  14  /  ALT  5   /  AlkPhos  101   @ 03:54    PT/INR - (  @ 15:48 )   PT: 12.1 SEC;   INR: 1.06     PTT - (  @ 15:48 )  PTT:31.4 SEC    Uric Acid: ( @ 15:48)  --       Fibrinogen: ( @ 15:48)  348.0    LDH: ( @ 15:48)  --         MEDICATIONS  (STANDING):  acetaminophen   Tablet .. 975 milliGRAM(s) Oral <User Schedule>  ceFAZolin   IVPB 2000 milliGRAM(s) IV Intermittent every 8 hours  chlorhexidine 4% Liquid 1 Application(s) Topical <User Schedule>  diphtheria/tetanus/pertussis (acellular) Vaccine (ADAcel) 0.5 milliLiter(s) IntraMuscular once  ibuprofen  Tablet. 600 milliGRAM(s) Oral every 6 hours  influenza   Vaccine 0.5 milliLiter(s) IntraMuscular once  lactated ringers. 1000 milliLiter(s) (125 mL/Hr) IV Continuous <Continuous>  oxytocin Infusion 41.667 milliUNIT(s)/Min (125 mL/Hr) IV Continuous <Continuous>  potassium chloride    Tablet ER 40 milliEquivalent(s) Oral every 4 hours    MEDICATIONS  (PRN):  diphenhydrAMINE 25 milliGRAM(s) Oral every 6 hours PRN Itching  glycerin Suppository - Adult 1 Suppository(s) Rectal at bedtime PRN Constipation  lanolin Ointment 1 Application(s) Topical every 6 hours PRN Sore Nipples  magnesium hydroxide Suspension 30 milliLiter(s) Oral two times a day PRN Constipation  oxyCODONE    IR 5 milliGRAM(s) Oral every 3 hours PRN Moderate to Severe Pain (4-10)  oxyCODONE    IR 5 milliGRAM(s) Oral once PRN Moderate to Severe Pain (4-10)  simethicone 80 milliGRAM(s) Chew every 4 hours PRN Gas      A/P: 35yo s/p ***, on Mg, stable.  HD# POD# PPD#  Neuro: c/w po pain meds prn, c/w mg for seizure ppx  CV: hemodynamically stable, continue to monitor VS  Pulm: Satting well on RA, encourage incentive spirometry, ambulation  GI: c/w reg diet  : voiding spontaneously  Heme: c/w HSQ, ambulation, SCDs for VTE ppx  Dispo: c/w routine postpartum care    JEANINE Sanchez PGY3

## 2019-12-24 NOTE — CONSULT NOTE ADULT - SUBJECTIVE AND OBJECTIVE BOX
SICU Consultation Note  =====================================================  HPI:  This is a 34F  at 39WGA with PMHx significant for anemia (received iron infusions in the past, last infusion was in November) who presented  for scheduled elective  with post-operative course complicated by post-partum hemorrhage necessitating return to OR for hemostasis with placement of ultrasound-guided Bakri balloon and administration of Methergin, TXA, Hemabate and resuscitation with 5L crystalloid, 2uPRBC, 250mL Albumin for estimated blood loss of ~3L. Called by primary team to evaluate patient for hypotension and tachycardia.    Patient seen and examined at the bedside. Appears somewhat lethargic, although able to mentate appropriately and endorses feeling tired. Denies headache, visual changes, chest pain or shortness of breath.    Vital signs: HR-100s, IBP-95/50 (MAP-65), Breathing comfortably on RA, SpO2>97%.    Allergies: peanuts (Anaphylaxis)    PAST MEDICAL & SURGICAL HISTORY:  Anemia  H/O:   History of termination of pregnancy    FAMILY HISTORY:  No pertinent family history in first degree relatives    SOCIAL HISTORY:    ADVANCE DIRECTIVES: Presumed Full Code     REVIEW OF SYSTEMS:    Pertinent ROS as listed above, otherwise negative    HOME MEDICATIONS: Iron, Prenatal vitamins    CURRENT MEDICATIONS:   --------------------------------------------------------------------------------------  Neurologic Medications  acetaminophen   Tablet .. 975 milliGRAM(s) Oral <User Schedule>  diphenhydrAMINE 25 milliGRAM(s) Oral every 6 hours PRN Itching  HYDROmorphone  Injectable 0.5 milliGRAM(s) IV Push every 10 minutes PRN Moderate Pain (4 - 6)  ibuprofen  Tablet. 600 milliGRAM(s) Oral every 6 hours  oxyCODONE    IR 5 milliGRAM(s) Oral every 3 hours PRN Moderate to Severe Pain (4-10)  oxyCODONE    IR 5 milliGRAM(s) Oral once PRN Moderate to Severe Pain (4-10)    Respiratory Medications    Cardiovascular Medications    Gastrointestinal Medications  glycerin Suppository - Adult 1 Suppository(s) Rectal at bedtime PRN Constipation  lactated ringers. 1000 milliLiter(s) IV Continuous <Continuous>  magnesium hydroxide Suspension 30 milliLiter(s) Oral two times a day PRN Constipation  simethicone 80 milliGRAM(s) Chew every 4 hours PRN Gas    Genitourinary Medications  oxytocin Infusion 41.667 milliUNIT(s)/Min IV Continuous <Continuous>    Hematologic/Oncologic Medications  diphtheria/tetanus/pertussis (acellular) Vaccine (ADAcel) 0.5 milliLiter(s) IntraMuscular once  heparin  Injectable 5000 Unit(s) SubCutaneous every 12 hours  influenza   Vaccine 0.5 milliLiter(s) IntraMuscular once    Antimicrobial/Immunologic Medications  ceFAZolin   IVPB 2000 milliGRAM(s) IV Intermittent every 8 hours    Endocrine/Metabolic Medications    Topical/Other Medications  lanolin Ointment 1 Application(s) Topical every 6 hours PRN Sore Nipples    --------------------------------------------------------------------------------------    VITAL SIGNS, INS/OUTS (last 24 hours):  --------------------------------------------------------------------------------------  T(C): 36.9 (19 @ 01:30), Max: 37 (19 @ 21:19)  HR: 108 (19 @ 02:30) (71 - 108)  BP: 117/71 (19 @ 22:15) (97/49 - 139/81)  BP(mean): 81 (19 @ 22:15) (49 - 93)  ABP: 108/67 (19 @ 02:30) (103/65 - 124/69)  ABP(mean): 81 (19 @ 02:30) (79 - 90)  RR: 22 (19 @ 02:30) (16 - 22)  SpO2: 100% (19 @ 02:30) (97% - 100%)     @ 07:01  -   @ 05:10  --------------------------------------------------------  IN:    Lactated Ringers IV Bolus: 5000 mL    lactated ringers.: 450 mL    lactated ringers.: 110 mL    Other: 2000 mL    oxytocin Infusion: 185 mL    oxytocin Infusion: 562.5 mL    oxytocin Infusion: 375 mL    Packed Red Blood Cells: 620 mL  Total IN: 9302.5 mL    OUT:    Estimated Blood Loss: 2757 mL    Indwelling Catheter - Urethral: 631 mL    Other: 570 mL  Total OUT: 3958 mL    Total NET: 5344.5 mL  --------------------------------------------------------------------------------------    EXAM  NEUROLOGY  RASS:   	GCS:    Exam: Somewhat lethargic. NAD, alert, oriented x 3, no focal deficits.    HEENT  Exam: Normocephalic, atraumatic.  EOMI    RESPIRATORY  Exam: Lungs clear to auscultation, Normal expansion/effort.      CARDIOVASCULAR  Exam: S1, S2.  Regular rate and rhythm.     GI/NUTRITION  Exam: Abdomen soft, appropriately tender to palpation.    VASCULAR  Exam: Extremities warm, pink, well-perfused.     MUSCULOSKELETAL  Exam: All extremities moving spontaneously without limitations.     SKIN:  Exam: Good skin turgor, no skin breakdown.     METABOLIC/FLUIDS/ELECTROLYTES  lactated ringers. 1000 milliLiter(s) IV Continuous <Continuous>      HEMATOLOGIC  [x] DVT Prophylaxis: heparin  Injectable 5000 Unit(s) SubCutaneous every 12 hours    Transfusions:	[] PRBC	[] Platelets		[] FFP	[] Cryoprecipitate    INFECTIOUS DISEASE  Antimicrobials/Immunologic Medications:  ceFAZolin   IVPB 2000 milliGRAM(s) IV Intermittent every 8 hours  diphtheria/tetanus/pertussis (acellular) Vaccine (ADAcel) 0.5 milliLiter(s) IntraMuscular once  influenza   Vaccine 0.5 milliLiter(s) IntraMuscular once    Tubes/Lines/Drains   [x] Peripheral IV  [] Central Venous Line     	[] R	[] L	[] IJ	[] Fem	[] SC	Date Placed:   [x] Arterial Line		[] R	[] L	[] Fem	[] Rad	[] Ax	Date Placed:   [] PICC:         	[] Midline		[] Mediport  [x] Urinary Catheter		Date Placed:     LABS  --------------------------------------------------------------------------------------  CBC ( @ 03:54)                              7.0<LL>                         10.02   )----------------(  108<L>     78.2<H>% Neutrophils, 9.3<L>% Lymphocytes, ANC: 7.84<H>                              21.1<L>              CBC ( @ 00:30)                              8.1<L>                         11.25<H>  )----------------(  110<L>     --    % Neutrophils, --    % Lymphocytes, ANC: --                                  24.0<L>                BMP ( @ 03:54)             136     |  105     |  5<L>  		Ca++ --      Ca 7.9<L>             ---------------------------------( 109<H>		Mg --                 3.6     |  19<L>   |  0.45<L>			Ph --      BMP ( @ 00:30)             136     |  105     |  5<L>  		Ca++ --      Ca 8.3<L>             ---------------------------------( 97    		Mg --                 3.6     |  20<L>   |  0.42<L>			Ph --        LFTs ( @ 03:54)      TPro 4.3<L> / Alb 2.6<L> / TBili 0.8 / DBili -- / AST 14 / ALT 5 / AlkPhos 101  LFTs ( @ 00:30)      TPro 4.7<L> / Alb 2.7<L> / TBili 0.9 / DBili -- / AST 13 / ALT 5 / AlkPhos 111    Coags ( 03:54)  aPTT 33.3 / INR 1.21<H> / PT 13.5<H>  Coags ( @ 00:30)  aPTT 34.5 / INR 1.14 / PT 13.0    ABG ( @ 19:50)     7.38 / 37 / 144<H> / 22 / -3.3 / 98.6%     Lactate: 2.4<H>    --------------------------------------------------------------------------------------    OTHER LABS    IMAGING RESULTS  Echo:   CT:   Xray:     ASSESSMENT:  34F  s/p  complicated by post-partum hemorrhage    PLAN:    Neurologic:   -PRN pain control Tylenol, Dilaudid    Respiratory:   -Stable on RA, monitor SpO2    Cardiovascular:   -Hypovolemic s/p post-partum hemorrhage with hypotension and tachycardia, with MAPs~65mmHg.  Continue rescusitation with PRBCs and Crystalloid.  -Not currently requiring any vasopressors.    Gastrointestinal/Nutrition:   -Diet per primary team.     Renal/Genitourinary:   -Tolbert for strict I/Os, monitor UOP  -Monitor electrolytes, replete as needed    Hematologic:   -EBL 3L s/p post-partum hemorrhage. Most recent H/H~. Continue resuscitation with PRBCs and Crystalloid.   -Repeat serial H/Hs, transfuse for Hb<7    Infectious Disease:   -On Ancef while Bakri balloon in place    Tubes/Lines/Drains: Alla Escamilla Foley, Bakri Balloon    Endocrine:   -No Active Issues    Disposition: PACU/Floor    --------------------------------------------------------------------------------------    Critical Care Diagnoses: SICU Consultation Note  =====================================================  HPI:  This is a 34F  at 39WGA with PMHx significant for anemia (received iron infusions in the past, last infusion was in November) who presented  for scheduled elective  with post-operative course complicated by post-partum hemorrhage necessitating return to OR for hemostasis with placement of ultrasound-guided Bakri balloon and administration of Methergin, TXA, Hemabate and resuscitation with 5L crystalloid, 2uPRBC, 250mL Albumin for estimated blood loss of ~3L. Called by primary team to evaluate patient for hypotension and tachycardia.    Patient seen and examined at the bedside. Appears somewhat lethargic, although able to mentate appropriately and endorses feeling tired. Denies headache, visual changes, chest pain or shortness of breath.    Vital signs: HR-100s, IBP-95/50 (MAP-65), Breathing comfortably on RA, SpO2>97%.    Allergies: peanuts (Anaphylaxis)    PAST MEDICAL & SURGICAL HISTORY:  Anemia  H/O:   History of termination of pregnancy    FAMILY HISTORY:  No pertinent family history in first degree relatives    SOCIAL HISTORY:    ADVANCE DIRECTIVES: Presumed Full Code     REVIEW OF SYSTEMS:    Pertinent ROS as listed above, otherwise negative    HOME MEDICATIONS: Iron, Prenatal vitamins    CURRENT MEDICATIONS:   --------------------------------------------------------------------------------------  Neurologic Medications  acetaminophen   Tablet .. 975 milliGRAM(s) Oral <User Schedule>  diphenhydrAMINE 25 milliGRAM(s) Oral every 6 hours PRN Itching  HYDROmorphone  Injectable 0.5 milliGRAM(s) IV Push every 10 minutes PRN Moderate Pain (4 - 6)  ibuprofen  Tablet. 600 milliGRAM(s) Oral every 6 hours  oxyCODONE    IR 5 milliGRAM(s) Oral every 3 hours PRN Moderate to Severe Pain (4-10)  oxyCODONE    IR 5 milliGRAM(s) Oral once PRN Moderate to Severe Pain (4-10)    Respiratory Medications    Cardiovascular Medications    Gastrointestinal Medications  glycerin Suppository - Adult 1 Suppository(s) Rectal at bedtime PRN Constipation  lactated ringers. 1000 milliLiter(s) IV Continuous <Continuous>  magnesium hydroxide Suspension 30 milliLiter(s) Oral two times a day PRN Constipation  simethicone 80 milliGRAM(s) Chew every 4 hours PRN Gas    Genitourinary Medications  oxytocin Infusion 41.667 milliUNIT(s)/Min IV Continuous <Continuous>    Hematologic/Oncologic Medications  diphtheria/tetanus/pertussis (acellular) Vaccine (ADAcel) 0.5 milliLiter(s) IntraMuscular once  heparin  Injectable 5000 Unit(s) SubCutaneous every 12 hours  influenza   Vaccine 0.5 milliLiter(s) IntraMuscular once    Antimicrobial/Immunologic Medications  ceFAZolin   IVPB 2000 milliGRAM(s) IV Intermittent every 8 hours    Endocrine/Metabolic Medications    Topical/Other Medications  lanolin Ointment 1 Application(s) Topical every 6 hours PRN Sore Nipples    --------------------------------------------------------------------------------------    VITAL SIGNS, INS/OUTS (last 24 hours):  --------------------------------------------------------------------------------------  T(C): 36.9 (19 @ 01:30), Max: 37 (19 @ 21:19)  HR: 108 (19 @ 02:30) (71 - 108)  BP: 117/71 (19 @ 22:15) (97/49 - 139/81)  BP(mean): 81 (19 @ 22:15) (49 - 93)  ABP: 108/67 (19 @ 02:30) (103/65 - 124/69)  ABP(mean): 81 (19 @ 02:30) (79 - 90)  RR: 22 (19 @ 02:30) (16 - 22)  SpO2: 100% (19 @ 02:30) (97% - 100%)     @ 07:01  -   @ 05:10  --------------------------------------------------------  IN:    Lactated Ringers IV Bolus: 5000 mL    lactated ringers.: 450 mL    lactated ringers.: 110 mL    Other: 2000 mL    oxytocin Infusion: 185 mL    oxytocin Infusion: 562.5 mL    oxytocin Infusion: 375 mL    Packed Red Blood Cells: 620 mL  Total IN: 9302.5 mL    OUT:    Estimated Blood Loss: 2757 mL    Indwelling Catheter - Urethral: 631 mL    Other: 570 mL  Total OUT: 3958 mL    Total NET: 5344.5 mL  --------------------------------------------------------------------------------------    EXAM  NEUROLOGY  RASS:   	GCS:    Exam: Somewhat lethargic. NAD, alert, oriented x 3, no focal deficits.    HEENT  Exam: Normocephalic, atraumatic.  EOMI    RESPIRATORY  Exam: Lungs clear to auscultation, Normal expansion/effort.      CARDIOVASCULAR  Exam: S1, S2.  Regular rate and rhythm.     GI/NUTRITION  Exam: Abdomen soft, appropriately tender to palpation.    VASCULAR  Exam: Extremities warm, pink, well-perfused.     MUSCULOSKELETAL  Exam: All extremities moving spontaneously without limitations.     SKIN:  Exam: Good skin turgor, no skin breakdown.     METABOLIC/FLUIDS/ELECTROLYTES  lactated ringers. 1000 milliLiter(s) IV Continuous <Continuous>      HEMATOLOGIC  [x] DVT Prophylaxis: heparin  Injectable 5000 Unit(s) SubCutaneous every 12 hours    Transfusions:	[] PRBC	[] Platelets		[] FFP	[] Cryoprecipitate    INFECTIOUS DISEASE  Antimicrobials/Immunologic Medications:  ceFAZolin   IVPB 2000 milliGRAM(s) IV Intermittent every 8 hours  diphtheria/tetanus/pertussis (acellular) Vaccine (ADAcel) 0.5 milliLiter(s) IntraMuscular once  influenza   Vaccine 0.5 milliLiter(s) IntraMuscular once    Tubes/Lines/Drains   [x] Peripheral IV  [] Central Venous Line     	[] R	[] L	[] IJ	[] Fem	[] SC	Date Placed:   [x] Arterial Line		[] R	[] L	[] Fem	[] Rad	[] Ax	Date Placed:   [] PICC:         	[] Midline		[] Mediport  [x] Urinary Catheter		Date Placed:     LABS  --------------------------------------------------------------------------------------  CBC ( @ 03:54)                              7.0<LL>                         10.02   )----------------(  108<L>     78.2<H>% Neutrophils, 9.3<L>% Lymphocytes, ANC: 7.84<H>                              21.1<L>              CBC ( @ 00:30)                              8.1<L>                         11.25<H>  )----------------(  110<L>     --    % Neutrophils, --    % Lymphocytes, ANC: --                                  24.0<L>                BMP ( @ 03:54)             136     |  105     |  5<L>  		Ca++ --      Ca 7.9<L>             ---------------------------------( 109<H>		Mg --                 3.6     |  19<L>   |  0.45<L>			Ph --      BMP ( @ 00:30)             136     |  105     |  5<L>  		Ca++ --      Ca 8.3<L>             ---------------------------------( 97    		Mg --                 3.6     |  20<L>   |  0.42<L>			Ph --        LFTs ( 03:54)      TPro 4.3<L> / Alb 2.6<L> / TBili 0.8 / DBili -- / AST 14 / ALT 5 / AlkPhos 101  LFTs ( @ 00:30)      TPro 4.7<L> / Alb 2.7<L> / TBili 0.9 / DBili -- / AST 13 / ALT 5 / AlkPhos 111    Coags ( 03:54)  aPTT 33.3 / INR 1.21<H> / PT 13.5<H>  Coags ( @ 00:30)  aPTT 34.5 / INR 1.14 / PT 13.0    ABG ( @ 19:50)     7.38 / 37 / 144<H> / 22 / -3.3 / 98.6%     Lactate: 2.4<H>    --------------------------------------------------------------------------------------    OTHER LABS    IMAGING RESULTS  Echo:   CT:   Xray:     ASSESSMENT:  34F  s/p  complicated by post-partum hemorrhage s/p ROR w/o signs of active bleeding and bakri balloon placement.      PLAN:    Neurologic:   -PRN pain control Tylenol, Dilaudid    Respiratory:   -Stable on RA, monitor SpO2    Cardiovascular:   -Hypovolemic s/p post-partum hemorrhage with hypotension and tachycardia, with MAPs~65mmHg.  Continue rescusitation with PRBCs and Crystalloid.  -Not currently requiring any vasopressors.    Gastrointestinal/Nutrition:   -NPO due to possibility of ROR    Renal/Genitourinary:   -Tolbert for strict I/Os, monitor UOP  -Monitor electrolytes, replete as needed  - Hypomagnesemic, s/p repletions; will trend    Hematologic:   -EBL 3L s/p post-partum hemorrhage. Most recent H/H~. Continue resuscitation with PRBCs and Crystalloid.   -Repeat serial H/Hs, transfuse for Hb<7  - will obtain q6 labs to trend crit    Infectious Disease:   -On Ancef while Bakri balloon in place    Tubes/Lines/Drains: Alla Escamilla Foley, Bakri Balloon    Endocrine:   -No Active Issues    Disposition: SICU    --------------------------------------------------------------------------------------    Critical Care Diagnoses: Post partum hemorrhage, s/p c section

## 2019-12-24 NOTE — CHART NOTE - NSCHARTNOTEFT_GEN_A_CORE
Ob  note     Pt alert and oriented.  Appreciate SICU consult, need to aggressively resuscitate.  Only one functioning peripheral IV, decision made to stop IV fluids and run blood products through that line until another line can be obtained.  Pt initially refused EJ/ IJ line but has now agreed to one.  Anesthesia will attempt to place shortly.  FFP also ordered at this time.  Dr. Salazar updated via telephone of above plan.     Vicenta Hunter MD

## 2019-12-24 NOTE — CHART NOTE - NSCHARTNOTEFT_GEN_A_CORE
Attending Note    35 yo POD#1 s/p scheduled RCS complicated by PPH due to uterine atony evaluated at bedside. Overnight patient became hypotensive and tachycardic. Poor peripheral IV access recommendation was to place AJ line.  Patient has Bakri balloon in place and is s/p 4units of PRBCs, 1unit of FFP, 1unit of Albumin.  SICU consultation obtained.  Patient evaluated at bedside. Patient reports uterine cramping. Denies shortness of breath, palpitations, visual changes, etc  VS  99.6  P 115  /58  O2 sat 98% RA  I/O's 1062/4103 Bakri 610ml  x 12hrs 5cc past hour  Heent nl  CV sinus tachycardia  Abd fundus firm  Ext no CCE  neuro AAOx 3  labs 7.0/21.1  4am    A: POD#1 s/p RCS PPH, 4units pRBCS, s/p multiple uterotonics Bakri in pace  P: SICU transfer for closer observation     Strict I/O's     Continue IV antibiotics     Plan of care discussed with patient and family     Mary

## 2019-12-24 NOTE — PROGRESS NOTE ADULT - PROBLEM SELECTOR PLAN 2
Neuro: po pain meds prn  CV: Hemodynamically stable, continue to monitor closely  Pulm: Saturating well on room air, encourage incentive spirometry  GI: NPO  : UOP adequate, continue monitoring strict I&Os  replete electrolytes prn  Heme: c/w SCDs for DVT ppx  Dispo: Continue routine post-op care, appreciate SICU care    Pt seen and d/w Dr. Martin Sanchez PGY3

## 2019-12-24 NOTE — PROGRESS NOTE ADULT - ASSESSMENT
33yo  s/p scheduled repeat C/S, QBL 817cc, c/b PPH of 1940cc POD#0, s/p Bakhri and VPx3, s/p 6u pRBC and 2u FFP.  Admitted to SICU for tachycardia and hypotension.  HD#2, POD#1

## 2019-12-25 LAB
ALBUMIN SERPL ELPH-MCNC: 2.6 G/DL — LOW (ref 3.3–5)
ALP SERPL-CCNC: 83 U/L — SIGNIFICANT CHANGE UP (ref 40–120)
ALT FLD-CCNC: 6 U/L — SIGNIFICANT CHANGE UP (ref 4–33)
ANION GAP SERPL CALC-SCNC: 8 MMO/L — SIGNIFICANT CHANGE UP (ref 7–14)
ANION GAP SERPL CALC-SCNC: 9 MMO/L — SIGNIFICANT CHANGE UP (ref 7–14)
APTT BLD: 30.7 SEC — SIGNIFICANT CHANGE UP (ref 27.5–36.3)
APTT BLD: 30.8 SEC — SIGNIFICANT CHANGE UP (ref 27.5–36.3)
AST SERPL-CCNC: 16 U/L — SIGNIFICANT CHANGE UP (ref 4–32)
BILIRUB SERPL-MCNC: 0.6 MG/DL — SIGNIFICANT CHANGE UP (ref 0.2–1.2)
BUN SERPL-MCNC: 5 MG/DL — LOW (ref 7–23)
BUN SERPL-MCNC: 8 MG/DL — SIGNIFICANT CHANGE UP (ref 7–23)
CALCIUM SERPL-MCNC: 7.9 MG/DL — LOW (ref 8.4–10.5)
CALCIUM SERPL-MCNC: 8 MG/DL — LOW (ref 8.4–10.5)
CHLORIDE SERPL-SCNC: 107 MMOL/L — SIGNIFICANT CHANGE UP (ref 98–107)
CHLORIDE SERPL-SCNC: 109 MMOL/L — HIGH (ref 98–107)
CO2 SERPL-SCNC: 20 MMOL/L — LOW (ref 22–31)
CO2 SERPL-SCNC: 21 MMOL/L — LOW (ref 22–31)
CREAT SERPL-MCNC: 0.46 MG/DL — LOW (ref 0.5–1.3)
CREAT SERPL-MCNC: 0.5 MG/DL — SIGNIFICANT CHANGE UP (ref 0.5–1.3)
FIBRINOGEN PPP-MCNC: 434 MG/DL — SIGNIFICANT CHANGE UP (ref 350–510)
FIBRINOGEN PPP-MCNC: 483 MG/DL — SIGNIFICANT CHANGE UP (ref 350–510)
GLUCOSE SERPL-MCNC: 75 MG/DL — SIGNIFICANT CHANGE UP (ref 70–99)
GLUCOSE SERPL-MCNC: 80 MG/DL — SIGNIFICANT CHANGE UP (ref 70–99)
HCT VFR BLD CALC: 23.9 % — LOW (ref 34.5–45)
HCT VFR BLD CALC: 24 % — LOW (ref 34.5–45)
HCT VFR BLD CALC: 24.4 % — LOW (ref 34.5–45)
HGB BLD-MCNC: 7.8 G/DL — LOW (ref 11.5–15.5)
HGB BLD-MCNC: 7.8 G/DL — LOW (ref 11.5–15.5)
HGB BLD-MCNC: 8.1 G/DL — LOW (ref 11.5–15.5)
INR BLD: 1.01 — SIGNIFICANT CHANGE UP (ref 0.88–1.17)
INR BLD: 1.07 — SIGNIFICANT CHANGE UP (ref 0.88–1.17)
MAGNESIUM SERPL-MCNC: 1.8 MG/DL — SIGNIFICANT CHANGE UP (ref 1.6–2.6)
MAGNESIUM SERPL-MCNC: 1.8 MG/DL — SIGNIFICANT CHANGE UP (ref 1.6–2.6)
MCHC RBC-ENTMCNC: 26.4 PG — LOW (ref 27–34)
MCHC RBC-ENTMCNC: 26.8 PG — LOW (ref 27–34)
MCHC RBC-ENTMCNC: 27 PG — SIGNIFICANT CHANGE UP (ref 27–34)
MCHC RBC-ENTMCNC: 32.5 % — SIGNIFICANT CHANGE UP (ref 32–36)
MCHC RBC-ENTMCNC: 32.6 % — SIGNIFICANT CHANGE UP (ref 32–36)
MCHC RBC-ENTMCNC: 33.2 % — SIGNIFICANT CHANGE UP (ref 32–36)
MCV RBC AUTO: 81.3 FL — SIGNIFICANT CHANGE UP (ref 80–100)
MCV RBC AUTO: 81.4 FL — SIGNIFICANT CHANGE UP (ref 80–100)
MCV RBC AUTO: 82.1 FL — SIGNIFICANT CHANGE UP (ref 80–100)
NRBC # FLD: 0 K/UL — SIGNIFICANT CHANGE UP (ref 0–0)
NRBC # FLD: 0 K/UL — SIGNIFICANT CHANGE UP (ref 0–0)
NRBC # FLD: 0.02 K/UL — SIGNIFICANT CHANGE UP (ref 0–0)
PHOSPHATE SERPL-MCNC: 2.9 MG/DL — SIGNIFICANT CHANGE UP (ref 2.5–4.5)
PHOSPHATE SERPL-MCNC: 3.2 MG/DL — SIGNIFICANT CHANGE UP (ref 2.5–4.5)
PLATELET # BLD AUTO: 79 K/UL — LOW (ref 150–400)
PLATELET # BLD AUTO: 88 K/UL — LOW (ref 150–400)
PLATELET # BLD AUTO: 95 K/UL — LOW (ref 150–400)
PMV BLD: 10.4 FL — SIGNIFICANT CHANGE UP (ref 7–13)
PMV BLD: 10.7 FL — SIGNIFICANT CHANGE UP (ref 7–13)
PMV BLD: 11.2 FL — SIGNIFICANT CHANGE UP (ref 7–13)
POTASSIUM SERPL-MCNC: 3.8 MMOL/L — SIGNIFICANT CHANGE UP (ref 3.5–5.3)
POTASSIUM SERPL-MCNC: 3.8 MMOL/L — SIGNIFICANT CHANGE UP (ref 3.5–5.3)
POTASSIUM SERPL-SCNC: 3.8 MMOL/L — SIGNIFICANT CHANGE UP (ref 3.5–5.3)
POTASSIUM SERPL-SCNC: 3.8 MMOL/L — SIGNIFICANT CHANGE UP (ref 3.5–5.3)
PROT SERPL-MCNC: 4.9 G/DL — LOW (ref 6–8.3)
PROTHROM AB SERPL-ACNC: 11.5 SEC — SIGNIFICANT CHANGE UP (ref 9.8–13.1)
PROTHROM AB SERPL-ACNC: 12.2 SEC — SIGNIFICANT CHANGE UP (ref 9.8–13.1)
RBC # BLD: 2.91 M/UL — LOW (ref 3.8–5.2)
RBC # BLD: 2.95 M/UL — LOW (ref 3.8–5.2)
RBC # BLD: 3 M/UL — LOW (ref 3.8–5.2)
RBC # FLD: 19.4 % — HIGH (ref 10.3–14.5)
RBC # FLD: 19.7 % — HIGH (ref 10.3–14.5)
RBC # FLD: 19.9 % — HIGH (ref 10.3–14.5)
SODIUM SERPL-SCNC: 137 MMOL/L — SIGNIFICANT CHANGE UP (ref 135–145)
SODIUM SERPL-SCNC: 137 MMOL/L — SIGNIFICANT CHANGE UP (ref 135–145)
WBC # BLD: 7.86 K/UL — SIGNIFICANT CHANGE UP (ref 3.8–10.5)
WBC # BLD: 7.93 K/UL — SIGNIFICANT CHANGE UP (ref 3.8–10.5)
WBC # BLD: 8.07 K/UL — SIGNIFICANT CHANGE UP (ref 3.8–10.5)
WBC # FLD AUTO: 7.86 K/UL — SIGNIFICANT CHANGE UP (ref 3.8–10.5)
WBC # FLD AUTO: 7.93 K/UL — SIGNIFICANT CHANGE UP (ref 3.8–10.5)
WBC # FLD AUTO: 8.07 K/UL — SIGNIFICANT CHANGE UP (ref 3.8–10.5)

## 2019-12-25 RX ORDER — OXYCODONE HYDROCHLORIDE 5 MG/1
5 TABLET ORAL
Refills: 0 | Status: DISCONTINUED | OUTPATIENT
Start: 2019-12-25 | End: 2019-12-26

## 2019-12-25 RX ORDER — ACETAMINOPHEN 500 MG
975 TABLET ORAL EVERY 6 HOURS
Refills: 0 | Status: DISCONTINUED | OUTPATIENT
Start: 2019-12-25 | End: 2019-12-26

## 2019-12-25 RX ORDER — ASCORBIC ACID 60 MG
500 TABLET,CHEWABLE ORAL DAILY
Refills: 0 | Status: DISCONTINUED | OUTPATIENT
Start: 2019-12-25 | End: 2019-12-26

## 2019-12-25 RX ORDER — OXYCODONE HYDROCHLORIDE 5 MG/1
5 TABLET ORAL ONCE
Refills: 0 | Status: DISCONTINUED | OUTPATIENT
Start: 2019-12-25 | End: 2019-12-26

## 2019-12-25 RX ORDER — MAGNESIUM SULFATE 500 MG/ML
2 VIAL (ML) INJECTION ONCE
Refills: 0 | Status: COMPLETED | OUTPATIENT
Start: 2019-12-25 | End: 2019-12-25

## 2019-12-25 RX ORDER — FERROUS SULFATE 325(65) MG
325 TABLET ORAL THREE TIMES A DAY
Refills: 0 | Status: DISCONTINUED | OUTPATIENT
Start: 2019-12-25 | End: 2019-12-26

## 2019-12-25 RX ORDER — POTASSIUM PHOSPHATE, MONOBASIC POTASSIUM PHOSPHATE, DIBASIC 236; 224 MG/ML; MG/ML
15 INJECTION, SOLUTION INTRAVENOUS ONCE
Refills: 0 | Status: COMPLETED | OUTPATIENT
Start: 2019-12-25 | End: 2019-12-25

## 2019-12-25 RX ORDER — HEPARIN SODIUM 5000 [USP'U]/ML
5000 INJECTION INTRAVENOUS; SUBCUTANEOUS EVERY 12 HOURS
Refills: 0 | Status: DISCONTINUED | OUTPATIENT
Start: 2019-12-25 | End: 2019-12-26

## 2019-12-25 RX ORDER — IBUPROFEN 200 MG
600 TABLET ORAL EVERY 6 HOURS
Refills: 0 | Status: DISCONTINUED | OUTPATIENT
Start: 2019-12-25 | End: 2019-12-26

## 2019-12-25 RX ORDER — ACETAMINOPHEN 500 MG
1000 TABLET ORAL EVERY 6 HOURS
Refills: 0 | Status: DISCONTINUED | OUTPATIENT
Start: 2019-12-25 | End: 2019-12-26

## 2019-12-25 RX ORDER — SENNA PLUS 8.6 MG/1
2 TABLET ORAL DAILY
Refills: 0 | Status: DISCONTINUED | OUTPATIENT
Start: 2019-12-25 | End: 2019-12-26

## 2019-12-25 RX ADMIN — POTASSIUM PHOSPHATE, MONOBASIC POTASSIUM PHOSPHATE, DIBASIC 62.5 MILLIMOLE(S): 236; 224 INJECTION, SOLUTION INTRAVENOUS at 05:17

## 2019-12-25 RX ADMIN — Medication 975 MILLIGRAM(S): at 03:54

## 2019-12-25 RX ADMIN — Medication 600 MILLIGRAM(S): at 18:52

## 2019-12-25 RX ADMIN — SIMETHICONE 80 MILLIGRAM(S): 80 TABLET, CHEWABLE ORAL at 18:52

## 2019-12-25 RX ADMIN — Medication 600 MILLIGRAM(S): at 20:00

## 2019-12-25 RX ADMIN — SODIUM CHLORIDE 125 MILLILITER(S): 9 INJECTION, SOLUTION INTRAVENOUS at 03:55

## 2019-12-25 RX ADMIN — Medication 50 GRAM(S): at 05:16

## 2019-12-25 RX ADMIN — Medication 975 MILLIGRAM(S): at 08:01

## 2019-12-25 RX ADMIN — CHLORHEXIDINE GLUCONATE 1 APPLICATION(S): 213 SOLUTION TOPICAL at 07:49

## 2019-12-25 NOTE — PROGRESS NOTE ADULT - ASSESSMENT
33yo  s/p scheduled repeat C/S, QBL 817cc, c/b PPH of 1940cc POD#0, s/p Bakhri and VPx3, s/p 6u pRBC and 2u FFP.  Admitted to SICU for tachycardia and hypotension.  S/p Bakhri removal POD#1.  HD#3, POD#2

## 2019-12-25 NOTE — PROGRESS NOTE ADULT - SUBJECTIVE AND OBJECTIVE BOX
SICU Daily Progress Note  =====================================================  Overnight events: Bakri was removed by OBGYN, pt tolerated well. Pt received 2u PRBC fro H&H of 6.,7/20.6 and responded to 81/24.2. Pt got another PIV placed.    HPI: This is a 34F  at 39WGA with PMHx significant for anemia (received iron infusions in the past, last infusion was in November) who presented  for scheduled elective  with post-operative course complicated by post-partum hemorrhage necessitating return to OR for hemostasis with placement of ultrasound-guided Bakri balloon and administration of Methergin, TXA, Hemabate and resuscitation with 5L crystalloid, 2uPRBC, 250mL Albumin for estimated blood loss of ~3L. Called by primary team to evaluate patient for hypotension and tachycardia.      Allergies: peanuts (Anaphylaxis)    PAST MEDICAL & SURGICAL HISTORY:  Anemia  H/O:   History of termination of pregnancy    FAMILY HISTORY:  No pertinent family history in first degree relatives    SOCIAL HISTORY:    ADVANCE DIRECTIVES: Presumed Full Code     REVIEW OF SYSTEMS:    Pertinent ROS as listed above, otherwise negative    MEDICATIONS  (STANDING):  acetaminophen   Tablet .. 975 milliGRAM(s) Oral <User Schedule>  chlorhexidine 4% Liquid 1 Application(s) Topical <User Schedule>  diphtheria/tetanus/pertussis (acellular) Vaccine (ADAcel) 0.5 milliLiter(s) IntraMuscular once  ibuprofen  Tablet. 600 milliGRAM(s) Oral every 6 hours  influenza   Vaccine 0.5 milliLiter(s) IntraMuscular once  lactated ringers. 1000 milliLiter(s) (125 mL/Hr) IV Continuous <Continuous>  oxytocin Infusion 41.667 milliUNIT(s)/Min (125 mL/Hr) IV Continuous <Continuous>    MEDICATIONS  (PRN):  diphenhydrAMINE 25 milliGRAM(s) Oral every 6 hours PRN Itching  glycerin Suppository - Adult 1 Suppository(s) Rectal at bedtime PRN Constipation  lanolin Ointment 1 Application(s) Topical every 6 hours PRN Sore Nipples  magnesium hydroxide Suspension 30 milliLiter(s) Oral two times a day PRN Constipation  oxyCODONE    IR 5 milliGRAM(s) Oral every 3 hours PRN Moderate to Severe Pain (4-10)  oxyCODONE    IR 5 milliGRAM(s) Oral once PRN Moderate to Severe Pain (4-10)  simethicone 80 milliGRAM(s) Chew every 4 hours PRN Gas    ICU Vital Signs Last 24 Hrs  T(C): 37.6 (24 Dec 2019 20:00), Max: 37.9 (24 Dec 2019 10:00)  T(F): 99.6 (24 Dec 2019 20:00), Max: 100.2 (24 Dec 2019 10:00)  HR: 107 (24 Dec 2019 23:00) (79 - 125)  BP: 116/67 (24 Dec 2019 19:00) (94/49 - 147/65)  BP(mean): 76 (24 Dec 2019 19:00) (59 - 92)  ABP: 99/59 (24 Dec 2019 23:00) (77/38 - 124/69)  ABP(mean): 74 (24 Dec 2019 23:00) (50 - 88)  RR: 22 (24 Dec 2019 23:00) (19 - 32)  SpO2: 96% (24 Dec 2019 23:00) (95% - 100%)    I&O's Detail    23 Dec 2019 07:  -  24 Dec 2019 07:00  --------------------------------------------------------  IN:    Lactated Ringers IV Bolus: 5000 mL    lactated ringers.: 487.5 mL    lactated ringers.: 110 mL    Other: 2000 mL    oxytocin Infusion: 185 mL    oxytocin Infusion: 562.5 mL    oxytocin Infusion: 437.5 mL    Packed Red Blood Cells: 1240 mL    Plasma: 605 mL  Total IN: 24669.5 mL    OUT:    Estimated Blood Loss: 2757 mL    Indwelling Catheter - Urethral: 706 mL    Other: 610 mL  Total OUT: 4073 mL    Total NET: 6554.5 mL      24 Dec 2019 07:  -  25 Dec 2019 00:21  --------------------------------------------------------  IN:    IV PiggyBack: 350 mL    lactated ringers.: 1625 mL  Total IN: 1975 mL    OUT:    Indwelling Catheter - Urethral: 1490 mL    Other: 71 mL  Total OUT: 1561 mL    Total NET: 414 mL      --------------------------------------------------------------------------------------    EXAM  NEUROLOGY  Exam: A&Ox4    RESPIRATORY  Exam: Lungs clear to auscultation, Normal expansion/effort.      CARDIOVASCULAR  Exam: S1, S2.  Regular rate and rhythm.     GI/NUTRITION  Exam: Abdomen soft, mildly distended, appropriately tender to palpation.    VASCULAR  Exam: Extremities warm, pink, well-perfused.     MUSCULOSKELETAL  Exam: All extremities moving spontaneously without limitations.     SKIN:  Exam: Good skin turgor, no skin breakdown.     METABOLIC/FLUIDS/ELECTROLYTES  lactated ringers. 1000 milliLiter(s) IV Continuous <Continuous>      HEMATOLOGIC  [x] DVT Prophylaxis: heparin  Injectable 5000 Unit(s) SubCutaneous every 12 hours    Transfusions:	[] PRBC	[] Platelets		[] FFP	[] Cryoprecipitate    INFECTIOUS DISEASE  Antimicrobials/Immunologic Medications:  ceFAZolin   IVPB 2000 milliGRAM(s) IV Intermittent every 8 hours  diphtheria/tetanus/pertussis (acellular) Vaccine (ADAcel) 0.5 milliLiter(s) IntraMuscular once  influenza   Vaccine 0.5 milliLiter(s) IntraMuscular once    Tubes/Lines/Drains   [x] Peripheral IV  [] Central Venous Line     	[] R	[] L	[] IJ	[] Fem	[] SC	Date Placed:   [] Arterial Line		[] R	[] L	[] Fem	[] Rad	[] Ax	Date Placed:   [] PICC:         	[] Midline		[] Mediport  [x] Urinary Catheter		Date Placed:     LABS  --------------------------------------------------------------------------------------    CBC ( @ 20:01)                              8.1<L>                         8.00    )----------------(  73<L>      --    % Neutrophils, --    % Lymphocytes, ANC: --                                  24.2<L>  CBC ( @ 14:10)                              6.7<LL>                         7.97    )----------------(  77<L>      --    % Neutrophils, --    % Lymphocytes, ANC: --                                  20.6<LL>    BMP ( @ 14:10)             136     |  106     |  5<L>  		Ca++ --      Ca 8.2<L>             ---------------------------------( 88    		Mg 2.0                3.3<L>  |  21<L>   |  0.46<L>			Ph 3.5     BMP ( @ 08:33)             136     |  104     |  5<L>  		Ca++ --      Ca 8.1<L>             ---------------------------------( 97    		Mg 1.2<L>             3.4<L>  |  21<L>   |  0.45<L>			Ph 3.6       LFTs ( @ 03:54)      TPro 4.3<L> / Alb 2.6<L> / TBili 0.8 / DBili -- / AST 14 / ALT 5 / AlkPhos 101  LFTs ( @ 00:30)      TPro 4.7<L> / Alb 2.7<L> / TBili 0.9 / DBili -- / AST 13 / ALT 5 / AlkPhos 111    Coags ( @ 20:01)  aPTT 30.8 / INR 1.10 / PT 12.3  Coags ( @ 15:48)  aPTT 31.4 / INR 1.06 / PT 12.1      ABG ( @ 08:33)     7.44 / 34 / 93 / 24 / -0.7 / 97.8%     Lactate: 1.5   ABG ( @ 19:50)     7.38 / 37 / 144<H> / 22 / -3.3 / 98.6%     Lactate: 2.4<H>       --------------------------------------------------------------------------------------      ASSESSMENT:  34F  s/p  complicated by post-partum hemorrhage s/p ROR w/o signs of active bleeding and bakri balloon placement.      PLAN:    Neurologic:   -PRN pain control Tylenol, Dilaudid    Respiratory:   -Stable on RA, monitor SpO2    Cardiovascular:   -Hypovolemic s/p post-partum hemorrhage with hypotension and tachycardia, with MAPs~65mmHg.    - s/p 2u PRBC; will trend vitals and resuscitate as needed   -Not currently requiring any vasopressors.    Gastrointestinal/Nutrition:   -NPO     Renal/Genitourinary:   -Tomasz for strict I/Os  -Monitor electrolytes, replete as needed    Hematologic:   - EBL 3L s/p post-partum hemorrhage  - s/p 2u PRBC, will trend CBC  - transfuse for Hb<7  - Q6 CBC     Infectious Disease:   -Bakri out, will d/c'ed ancef     Tubes/Lines/Drains: Tomasz Escamilla    Endocrine:   -No Active Issues    Disposition: SICU    --------------------------------------------------------------------------------------    Critical Care Diagnoses: Post partum hemorrhage, s/p c section SICU Daily Progress Note  =====================================================  Overnight events: Bakri was removed by OBGYN, pt tolerated well. Pt received 2u PRBC fro H&H of 6.,7/20.6 and responded to 81/24.2. Pt got another PIV placed.    HPI: This is a 34F  at 39WGA with PMHx significant for anemia (received iron infusions in the past, last infusion was in November) who presented  for scheduled elective  with post-operative course complicated by post-partum hemorrhage necessitating return to OR for hemostasis with placement of ultrasound-guided Bakri balloon and administration of Methergin, TXA, Hemabate and resuscitation with 5L crystalloid, 2uPRBC, 250mL Albumin for estimated blood loss of ~3L. Called by primary team to evaluate patient for hypotension and tachycardia.      Allergies: peanuts (Anaphylaxis)    PAST MEDICAL & SURGICAL HISTORY:  Anemia  H/O:   History of termination of pregnancy    FAMILY HISTORY:  No pertinent family history in first degree relatives    SOCIAL HISTORY:    ADVANCE DIRECTIVES: Presumed Full Code     REVIEW OF SYSTEMS:    Pertinent ROS as listed above, otherwise negative    MEDICATIONS  (STANDING):  acetaminophen   Tablet .. 975 milliGRAM(s) Oral <User Schedule>  chlorhexidine 4% Liquid 1 Application(s) Topical <User Schedule>  diphtheria/tetanus/pertussis (acellular) Vaccine (ADAcel) 0.5 milliLiter(s) IntraMuscular once  ibuprofen  Tablet. 600 milliGRAM(s) Oral every 6 hours  influenza   Vaccine 0.5 milliLiter(s) IntraMuscular once  lactated ringers. 1000 milliLiter(s) (125 mL/Hr) IV Continuous <Continuous>  oxytocin Infusion 41.667 milliUNIT(s)/Min (125 mL/Hr) IV Continuous <Continuous>    MEDICATIONS  (PRN):  diphenhydrAMINE 25 milliGRAM(s) Oral every 6 hours PRN Itching  glycerin Suppository - Adult 1 Suppository(s) Rectal at bedtime PRN Constipation  lanolin Ointment 1 Application(s) Topical every 6 hours PRN Sore Nipples  magnesium hydroxide Suspension 30 milliLiter(s) Oral two times a day PRN Constipation  oxyCODONE    IR 5 milliGRAM(s) Oral every 3 hours PRN Moderate to Severe Pain (4-10)  oxyCODONE    IR 5 milliGRAM(s) Oral once PRN Moderate to Severe Pain (4-10)  simethicone 80 milliGRAM(s) Chew every 4 hours PRN Gas    ICU Vital Signs Last 24 Hrs  T(C): 37.6 (24 Dec 2019 20:00), Max: 37.9 (24 Dec 2019 10:00)  T(F): 99.6 (24 Dec 2019 20:00), Max: 100.2 (24 Dec 2019 10:00)  HR: 107 (24 Dec 2019 23:00) (79 - 125)  BP: 116/67 (24 Dec 2019 19:00) (94/49 - 147/65)  BP(mean): 76 (24 Dec 2019 19:00) (59 - 92)  ABP: 99/59 (24 Dec 2019 23:00) (77/38 - 124/69)  ABP(mean): 74 (24 Dec 2019 23:00) (50 - 88)  RR: 22 (24 Dec 2019 23:00) (19 - 32)  SpO2: 96% (24 Dec 2019 23:00) (95% - 100%)    I&O's Detail    23 Dec 2019 07:  -  24 Dec 2019 07:00  --------------------------------------------------------  IN:    Lactated Ringers IV Bolus: 5000 mL    lactated ringers.: 487.5 mL    lactated ringers.: 110 mL    Other: 2000 mL    oxytocin Infusion: 185 mL    oxytocin Infusion: 562.5 mL    oxytocin Infusion: 437.5 mL    Packed Red Blood Cells: 1240 mL    Plasma: 605 mL  Total IN: 05078.5 mL    OUT:    Estimated Blood Loss: 2757 mL    Indwelling Catheter - Urethral: 706 mL    Other: 610 mL  Total OUT: 4073 mL    Total NET: 6554.5 mL      24 Dec 2019 07:  -  25 Dec 2019 00:21  --------------------------------------------------------  IN:    IV PiggyBack: 350 mL    lactated ringers.: 1625 mL  Total IN: 1975 mL    OUT:    Indwelling Catheter - Urethral: 1490 mL    Other: 71 mL  Total OUT: 1561 mL    Total NET: 414 mL      --------------------------------------------------------------------------------------    EXAM  NEUROLOGY  Exam: A&Ox4    RESPIRATORY  Exam: Lungs clear to auscultation, Normal expansion/effort.      CARDIOVASCULAR  Exam: S1, S2.  Regular rate and rhythm.     GI/NUTRITION  Exam: Abdomen soft, mildly distended, appropriately tender to palpation.    VASCULAR  Exam: Extremities warm, pink, well-perfused.     MUSCULOSKELETAL  Exam: All extremities moving spontaneously without limitations.     SKIN:  Exam: Good skin turgor, no skin breakdown.     METABOLIC/FLUIDS/ELECTROLYTES  lactated ringers. 1000 milliLiter(s) IV Continuous <Continuous>      HEMATOLOGIC  [x] DVT Prophylaxis: heparin  Injectable 5000 Unit(s) SubCutaneous every 12 hours    Transfusions:	[] PRBC	[] Platelets		[] FFP	[] Cryoprecipitate    INFECTIOUS DISEASE  Antimicrobials/Immunologic Medications:  ceFAZolin   IVPB 2000 milliGRAM(s) IV Intermittent every 8 hours  diphtheria/tetanus/pertussis (acellular) Vaccine (ADAcel) 0.5 milliLiter(s) IntraMuscular once  influenza   Vaccine 0.5 milliLiter(s) IntraMuscular once    Tubes/Lines/Drains   [x] Peripheral IV  [] Central Venous Line     	[] R	[] L	[] IJ	[] Fem	[] SC	Date Placed:   [] Arterial Line		[] R	[] L	[] Fem	[] Rad	[] Ax	Date Placed:   [] PICC:         	[] Midline		[] Mediport  [x] Urinary Catheter		Date Placed:     LABS  --------------------------------------------------------------------------------------    CBC ( @ 20:01)                              8.1<L>                         8.00    )----------------(  73<L>      --    % Neutrophils, --    % Lymphocytes, ANC: --                                  24.2<L>  CBC ( @ 14:10)                              6.7<LL>                         7.97    )----------------(  77<L>      --    % Neutrophils, --    % Lymphocytes, ANC: --                                  20.6<LL>    BMP ( @ 14:10)             136     |  106     |  5<L>  		Ca++ --      Ca 8.2<L>             ---------------------------------( 88    		Mg 2.0                3.3<L>  |  21<L>   |  0.46<L>			Ph 3.5     BMP ( @ 08:33)             136     |  104     |  5<L>  		Ca++ --      Ca 8.1<L>             ---------------------------------( 97    		Mg 1.2<L>             3.4<L>  |  21<L>   |  0.45<L>			Ph 3.6       LFTs ( @ 03:54)      TPro 4.3<L> / Alb 2.6<L> / TBili 0.8 / DBili -- / AST 14 / ALT 5 / AlkPhos 101  LFTs ( @ 00:30)      TPro 4.7<L> / Alb 2.7<L> / TBili 0.9 / DBili -- / AST 13 / ALT 5 / AlkPhos 111    Coags ( @ 20:01)  aPTT 30.8 / INR 1.10 / PT 12.3  Coags ( @ 15:48)  aPTT 31.4 / INR 1.06 / PT 12.1      ABG ( @ 08:33)     7.44 / 34 / 93 / 24 / -0.7 / 97.8%     Lactate: 1.5   ABG ( @ 19:50)     7.38 / 37 / 144<H> / 22 / -3.3 / 98.6%     Lactate: 2.4<H>       --------------------------------------------------------------------------------------      ASSESSMENT:  34F  s/p  complicated by post-partum hemorrhage s/p ROR w/o signs of active bleeding and bakri balloon placement.      PLAN:    Neurologic:   -PRN pain control Tylenol, Dilaudid    Respiratory:   -Stable on RA, monitor SpO2    Cardiovascular:   - Hypovolemic s/p post-partum hemorrhage with hypotension and tachycardia, with MAPs~65mmHg.    - s/p 2u PRBC yesterday; will trend vitals and resuscitate as needed     Gastrointestinal/Nutrition:   - Consider advancing diet  - mIVF     Renal/Genitourinary:   -Dc lim   - Monitor electrolytes, replete as needed    Hematologic:   - EBL 3L s/p post-partum hemorrhage  - s/p 4u PRBC total, will trend CBC daily   - transfuse for Hb<7    Infectious Disease:   -Bakri out, will d/c'ed ancef     Tubes/Lines/Drains: Tomasz Escamilla dc a-line     Endocrine:   -No Active Issues    Disposition: SICU    --------------------------------------------------------------------------------------    Critical Care Diagnoses: Post partum hemorrhage, s/p c section Dressing: pressure dressing with telfa

## 2019-12-25 NOTE — CHART NOTE - NSCHARTNOTEFT_GEN_A_CORE
Attending NOte POD#2    Patient evaluated at bedside. Patient denies any acute events or major discomfort. Denies shortness of breath, chest pain, palpitations, dizziness, or any vaginal bleeding,etc  VS T 99.6  P 107 R 22 /67  O 2sat 96% RA I/O's 1975/1561/shift  UO 850ml/shift    Heent nl  CV sinus tachycardia  Abd soft, fundus firm at umbilicus  Ext 2+ b/l LE edema  no calf tenderness b/l  Neuro AAOx 3  Labs 8.1/24.2  WBC 8/0  plts 73K   PTT 30. 8  PT 12.3  INR 1.10 Fibrinogen 394.8    A: 33 yo P2 POD#2 s/p Scheduled RCS, PPH, s/p bakri balloon, s/p 6units of pRBCS total and aggressive resuscitation, clinically stable  P: Continue SICU disposition     Monitor I/o's and vitals     Advised patient to start pumping later today prior to milk letdown     Monitor V-pad     MBeauvil

## 2019-12-25 NOTE — PROGRESS NOTE ADULT - SUBJECTIVE AND OBJECTIVE BOX
Postpartum Note,  Section  She is a  34y woman who is now post-operative day:2    HAD EPISODE OF SEVERE PPH WHILE IN PACU/POST-OP  GOOD RESPONSE TO UTEROTONICS AND EVENTUAL PLACEMENT OF BAKRI DEVICE  HAS GOTTEN TOTAL 6 U PRBC AND 2 U FFP  BAKRI WAS REMOVED LAST NIGHT  MINIMAL BLEEDING SINCE THEN  STABLE H/H  WAS IN SICU TILL THIS AM--WAS TRANSFERRED TO PP UNIT THIS AM     Subjective:  The patient feels well.  She is ambulating.   She is tolerating regular diet.  She denies nausea and vomiting.  She is voiding.  Her pain is controlled.  She reports normal postpartum bleeding.    Physical exam:  Vital Signs Last 24 Hrs  T(C): 36.5 (25 Dec 2019 14:37), Max: 37.7 (24 Dec 2019 16:00)  T(F): 97.7 (25 Dec 2019 14:37), Max: 99.8 (24 Dec 2019 16:00)  HR: 97 (25 Dec 2019 14:37) (89 - 110)  BP: 118/72 (25 Dec 2019 14:37) (116/67 - 121/67)  BP(mean): 76 (24 Dec 2019 19:00) (76 - 76)  RR: 24 (25 Dec 2019 14:37) (18 - 27)  SpO2: 97% (25 Dec 2019 14:37) (96% - 100%)  Gen: NAD      LABS:                        7.8    7.93  )-----------( 88       ( 25 Dec 2019 08:05 )             23.9       Allergies    No Known Drug Allergies  peanuts (Anaphylaxis)    Intolerances      MEDICATIONS  (STANDING):  acetaminophen   Tablet .. 975 milliGRAM(s) Oral every 6 hours  acetaminophen   Tablet .. 975 milliGRAM(s) Oral <User Schedule>  acetaminophen  IVPB .. 1000 milliGRAM(s) IV Intermittent every 6 hours  chlorhexidine 4% Liquid 1 Application(s) Topical <User Schedule>  diphtheria/tetanus/pertussis (acellular) Vaccine (ADAcel) 0.5 milliLiter(s) IntraMuscular once  ibuprofen  Tablet. 600 milliGRAM(s) Oral every 6 hours  influenza   Vaccine 0.5 milliLiter(s) IntraMuscular once  lactated ringers. 1000 milliLiter(s) (125 mL/Hr) IV Continuous <Continuous>  oxytocin Infusion 41.667 milliUNIT(s)/Min (125 mL/Hr) IV Continuous <Continuous>    MEDICATIONS  (PRN):  diphenhydrAMINE 25 milliGRAM(s) Oral every 6 hours PRN Itching  glycerin Suppository - Adult 1 Suppository(s) Rectal at bedtime PRN Constipation  lanolin Ointment 1 Application(s) Topical every 6 hours PRN Sore Nipples  magnesium hydroxide Suspension 30 milliLiter(s) Oral two times a day PRN Constipation  oxyCODONE    IR 5 milliGRAM(s) Oral every 3 hours PRN Moderate to Severe Pain (4-10)  oxyCODONE    IR 5 milliGRAM(s) Oral once PRN Moderate to Severe Pain (4-10)  oxyCODONE    IR 5 milliGRAM(s) Oral every 3 hours PRN Moderate to Severe Pain (4-10)  oxyCODONE    IR 5 milliGRAM(s) Oral once PRN Moderate to Severe Pain (4-10)  simethicone 80 milliGRAM(s) Chew every 4 hours PRN Gas      Assessment and Plan  POD #2 s/p  section  Doing well.  Encourage ambulation.

## 2019-12-25 NOTE — PROGRESS NOTE ADULT - PROBLEM SELECTOR PLAN 2
Neuro: pain meds prn  CV: Hemodynamically stable, continue to monitor closely, AM labs stable  Pulm: Saturating well on room air, encourage incentive spirometry  GI: NPO, would consider advancing diet today  : UOP adequate, continue to monitor strict I&Os, consider lim removal  replete electrolytes prn  Heme: c/w SCDs for DVT ppx  ID: afebrile, WBC wnl, s/p ancef for ppx 2/2 Bakhri placement  Dispo: Continue routine post-op care, appreciate SICU care    JEANINE Sanchez PGY3

## 2019-12-25 NOTE — PROGRESS NOTE ADULT - SUBJECTIVE AND OBJECTIVE BOX
R3 Postpartum Note, HD#3, POD#2    33yo  s/p scheduled repeat C/S, QBL 817cc, c/b PPH of 1940cc POD#0, s/p Bakhri and VPx3, s/p 6u pRBC and 2u FFP.  Admitted to SICU for tachycardia and hypotension.  S/p Bakhri removal last night.    S: Patient seen and examined at bedside, no acute overnight events. No acute complaints.     Denies CP, SOB, N/V, fevers, and chills.  Tolbert in situ, NPO.    O:   Vital Signs Last 24 Hours  T(C): 37.4 (19 @ 00:00), Max: 37.9 (19 @ 10:00)  HR: 104 (19 @ 00:00) (79 - 125)  BP: 116/67 (19 @ 19:00) (94/49 - 147/65)  RR: 21 (19 @ 00:00) (20 - 32)  SpO2: 96% (19 @ 00:00) (95% - 100%)        Physical Exam:  Cardio: nl S1/S2, RRR  Pulm: CTABL  General: NAD  Abdomen: Soft, non-tender, uterus firm, non distended  Incision: Pfannenstiel incision, clean dry and intact  Tolbert: in situ draining clear urine  Ext: No pain or swelling    Labs:             7.8    7.86  )-----------( 79       (  @ 02:10 )             24.0      @ 02:10    137  |  107  |  5   ----------------------------<  75  3.8   |  21  |  0.50    Ca    7.9       @ 02:10  Phos  2.9      @ 02:10  Mg     1.8      @ 02:10    TPro  4.3  /  Alb  2.6  /  TBili  0.8  /  DBili  x   /  AST  14  /  ALT  5   /  AlkPhos  101   @ 03:54    PT/INR - (  @ 02:10 )   PT: 12.2 SEC;   INR: 1.07     PTT - (  @ 02:10 )  PTT:30.8 SEC    Uric Acid: ( @ 02:10)  --       Fibrinogen: ( @ 02:10)  434.0    LDH: ( @ 02:10)  --         MEDICATIONS  (STANDING):  acetaminophen   Tablet .. 975 milliGRAM(s) Oral <User Schedule>  chlorhexidine 4% Liquid 1 Application(s) Topical <User Schedule>  diphtheria/tetanus/pertussis (acellular) Vaccine (ADAcel) 0.5 milliLiter(s) IntraMuscular once  ibuprofen  Tablet. 600 milliGRAM(s) Oral every 6 hours  influenza   Vaccine 0.5 milliLiter(s) IntraMuscular once  lactated ringers. 1000 milliLiter(s) (125 mL/Hr) IV Continuous <Continuous>  oxytocin Infusion 41.667 milliUNIT(s)/Min (125 mL/Hr) IV Continuous <Continuous>    MEDICATIONS  (PRN):  diphenhydrAMINE 25 milliGRAM(s) Oral every 6 hours PRN Itching  glycerin Suppository - Adult 1 Suppository(s) Rectal at bedtime PRN Constipation  lanolin Ointment 1 Application(s) Topical every 6 hours PRN Sore Nipples  magnesium hydroxide Suspension 30 milliLiter(s) Oral two times a day PRN Constipation  oxyCODONE    IR 5 milliGRAM(s) Oral every 3 hours PRN Moderate to Severe Pain (4-10)  oxyCODONE    IR 5 milliGRAM(s) Oral once PRN Moderate to Severe Pain (4-10)  simethicone 80 milliGRAM(s) Chew every 4 hours PRN Gas

## 2019-12-26 ENCOUNTER — TRANSCRIPTION ENCOUNTER (OUTPATIENT)
Age: 34
End: 2019-12-26

## 2019-12-26 VITALS
TEMPERATURE: 98 F | RESPIRATION RATE: 16 BRPM | HEART RATE: 80 BPM | OXYGEN SATURATION: 100 % | SYSTOLIC BLOOD PRESSURE: 110 MMHG | DIASTOLIC BLOOD PRESSURE: 71 MMHG

## 2019-12-26 LAB
ALBUMIN SERPL ELPH-MCNC: 2.7 G/DL — LOW (ref 3.3–5)
ALP SERPL-CCNC: 81 U/L — SIGNIFICANT CHANGE UP (ref 40–120)
ALT FLD-CCNC: 7 U/L — SIGNIFICANT CHANGE UP (ref 4–33)
ANION GAP SERPL CALC-SCNC: 8 MMO/L — SIGNIFICANT CHANGE UP (ref 7–14)
APTT BLD: 29.2 SEC — SIGNIFICANT CHANGE UP (ref 27.5–36.3)
AST SERPL-CCNC: 14 U/L — SIGNIFICANT CHANGE UP (ref 4–32)
BILIRUB SERPL-MCNC: 0.5 MG/DL — SIGNIFICANT CHANGE UP (ref 0.2–1.2)
BUN SERPL-MCNC: 8 MG/DL — SIGNIFICANT CHANGE UP (ref 7–23)
CALCIUM SERPL-MCNC: 8.1 MG/DL — LOW (ref 8.4–10.5)
CHLORIDE SERPL-SCNC: 109 MMOL/L — HIGH (ref 98–107)
CO2 SERPL-SCNC: 22 MMOL/L — SIGNIFICANT CHANGE UP (ref 22–31)
CREAT SERPL-MCNC: 0.51 MG/DL — SIGNIFICANT CHANGE UP (ref 0.5–1.3)
FIBRINOGEN PPP-MCNC: 509.5 MG/DL — SIGNIFICANT CHANGE UP (ref 350–510)
GLUCOSE SERPL-MCNC: 76 MG/DL — SIGNIFICANT CHANGE UP (ref 70–99)
HCT VFR BLD CALC: 23.8 % — LOW (ref 34.5–45)
HGB BLD-MCNC: 7.9 G/DL — LOW (ref 11.5–15.5)
INR BLD: 1.03 — SIGNIFICANT CHANGE UP (ref 0.88–1.17)
MAGNESIUM SERPL-MCNC: 1.8 MG/DL — SIGNIFICANT CHANGE UP (ref 1.6–2.6)
MCHC RBC-ENTMCNC: 27.1 PG — SIGNIFICANT CHANGE UP (ref 27–34)
MCHC RBC-ENTMCNC: 33.2 % — SIGNIFICANT CHANGE UP (ref 32–36)
MCV RBC AUTO: 81.5 FL — SIGNIFICANT CHANGE UP (ref 80–100)
NRBC # FLD: 0 K/UL — SIGNIFICANT CHANGE UP (ref 0–0)
PHOSPHATE SERPL-MCNC: 3.4 MG/DL — SIGNIFICANT CHANGE UP (ref 2.5–4.5)
PLATELET # BLD AUTO: 95 K/UL — LOW (ref 150–400)
PMV BLD: 10.6 FL — SIGNIFICANT CHANGE UP (ref 7–13)
POTASSIUM SERPL-MCNC: 3.6 MMOL/L — SIGNIFICANT CHANGE UP (ref 3.5–5.3)
POTASSIUM SERPL-SCNC: 3.6 MMOL/L — SIGNIFICANT CHANGE UP (ref 3.5–5.3)
PROT SERPL-MCNC: 5.1 G/DL — LOW (ref 6–8.3)
PROTHROM AB SERPL-ACNC: 11.4 SEC — SIGNIFICANT CHANGE UP (ref 9.8–13.1)
RBC # BLD: 2.92 M/UL — LOW (ref 3.8–5.2)
RBC # FLD: 19.7 % — HIGH (ref 10.3–14.5)
SODIUM SERPL-SCNC: 139 MMOL/L — SIGNIFICANT CHANGE UP (ref 135–145)
WBC # BLD: 6.14 K/UL — SIGNIFICANT CHANGE UP (ref 3.8–10.5)
WBC # FLD AUTO: 6.14 K/UL — SIGNIFICANT CHANGE UP (ref 3.8–10.5)

## 2019-12-26 RX ORDER — DOCUSATE SODIUM 100 MG
1 CAPSULE ORAL
Qty: 60 | Refills: 0
Start: 2019-12-26

## 2019-12-26 RX ORDER — ACETAMINOPHEN 500 MG
3 TABLET ORAL
Qty: 0 | Refills: 0 | DISCHARGE
Start: 2019-12-26

## 2019-12-26 RX ORDER — IBUPROFEN 200 MG
1 TABLET ORAL
Qty: 0 | Refills: 0 | DISCHARGE
Start: 2019-12-26

## 2019-12-26 RX ORDER — METHENAMINE MANDELATE 1 G
1 TABLET ORAL
Qty: 0 | Refills: 0 | DISCHARGE

## 2019-12-26 RX ORDER — FERROUS SULFATE 325(65) MG
1 TABLET ORAL
Qty: 90 | Refills: 0
Start: 2019-12-26 | End: 2020-01-24

## 2019-12-26 RX ADMIN — Medication 975 MILLIGRAM(S): at 09:58

## 2019-12-26 RX ADMIN — Medication 500 MILLIGRAM(S): at 12:10

## 2019-12-26 RX ADMIN — Medication 975 MILLIGRAM(S): at 08:58

## 2019-12-26 RX ADMIN — Medication 975 MILLIGRAM(S): at 02:30

## 2019-12-26 RX ADMIN — Medication 325 MILLIGRAM(S): at 12:10

## 2019-12-26 RX ADMIN — HEPARIN SODIUM 5000 UNIT(S): 5000 INJECTION INTRAVENOUS; SUBCUTANEOUS at 05:51

## 2019-12-26 RX ADMIN — Medication 975 MILLIGRAM(S): at 03:30

## 2019-12-26 RX ADMIN — Medication 1 TABLET(S): at 12:10

## 2019-12-26 RX ADMIN — Medication 600 MILLIGRAM(S): at 13:10

## 2019-12-26 RX ADMIN — Medication 325 MILLIGRAM(S): at 05:50

## 2019-12-26 RX ADMIN — Medication 600 MILLIGRAM(S): at 12:10

## 2019-12-26 NOTE — DISCHARGE NOTE OB - CARE PLAN
Principal Discharge DX:	 delivery delivered  Goal:	complete recovery  Assessment and plan of treatment:	regular diet, activities as tolerated, nothing per vagina, take iron supplements, follow up with obstetrician in 10 days for postop visit  Secondary Diagnosis:	Acute blood loss as cause of postoperative anemia  Goal:	resolution of anemia w/o complications  Secondary Diagnosis:	Postpartum hemorrhage, unspecified type  Goal:	resolved

## 2019-12-26 NOTE — DISCHARGE NOTE OB - MEDICATION SUMMARY - MEDICATIONS TO TAKE
I will START or STAY ON the medications listed below when I get home from the hospital:    ibuprofen 600 mg oral tablet  -- 1 tab(s) by mouth every 6 hours, As Needed  -- Indication: For Pain    acetaminophen 325 mg oral tablet  -- 3 tab(s) by mouth 3 times a day, As Needed  -- Indication: For Pain    ferrous sulfate 325 mg (65 mg elemental iron) oral tablet  -- 1 tab(s) by mouth 3 times a day  -- Indication: For Anemia    Colace 100 mg oral capsule  -- 1 cap(s) by mouth 2 times a day   -- Medication should be taken with plenty of water.    -- Indication: For Constipation

## 2019-12-26 NOTE — CHART NOTE - NSCHARTNOTEFT_GEN_A_CORE
Attending NOte    Patient evaluated at bedside. Patient is s/p transfer from SICU 24hrs ago to regular postpartum bed.  Patient denies any acute events overnight. Tolerating regular diet. Ambulating, Lochia scant. Breast feeding. Minimal incisional discomfort. No dysuria. Denies any shortness of breath, chest pain, palpitations, dizziness, etc.    VS T 98.2  P 80  R 16  /71 O2 sat 100% RA    Heent nl  CV RRR  Abd fundus firm below umbilicus  incision, clean, intact, slight moisture, no erythema, nonodorous  Ext no calf tenderness b/l  Neuro AAOx 3  Labs H/h 7.9/ 23.8 WBC 6.14 plts 95K  PT 11.4  PTT 29.2   Fibrinogen 509 INR 1.02    A: 35 yo P3 POD#3 s/p scheduled RCS, PPH, s/p Bakri, 6units of pRBCs, TXA, uterotonics, hx of chronic anemia, clinically stable  P: DC planning 12/27 if patient remains clinically stable     Routine postop care     No need to repeat CBC. will f/u outpatient during Postop visit     Discharge instructions reviewed with patient     All questions and concerns addressed     Mary

## 2019-12-26 NOTE — DISCHARGE NOTE OB - PLAN OF CARE
complete recovery regular diet, activities as tolerated, nothing per vagina, take iron supplements, follow up with obstetrician in 10 days for postop visit resolution of anemia w/o complications resolved

## 2019-12-26 NOTE — PROGRESS NOTE ADULT - PROBLEM SELECTOR PLAN 1
- Continue motrin, tylenol, oxycodone PRN for pain control.  - Increase ambulation  - Continue regular diet  - Discharge planning    Alisa PGY1
- s/p 6u pRBC, 2u FFP  - check post-transfusion labs  - continue close monitoring  - strict I&Os
- s/p 6u pRBC, 2u FFP  - post-transfusion CBC appropriate rise, AM CBC stable  - s/p Bakhri removal  - continue pad counts  - continue to monitor VS, strict I&Os
- c/w IV resussitation  - s/p 2u pRBC, giving another 2u pRBC  - f/u stat labs  - continue to monitor VS closely  - SICU consult placed

## 2019-12-26 NOTE — DISCHARGE NOTE OB - HOSPITAL COURSE
Patient underwent repeat  section for a viable female. Patient was diagnosed and treated for postpartum hemorrhage, was transferred to ICU for immediate recovery. Patient received transfusion of PRBC and FFP. Her bleeding stabilized and her blood count remained stable as well after transfusion. Patient was transferred to the floor, the remainder of her postoperative course was uncomplicated. Patient was discharged home on postoperative day 3 in stable condition. She was instructed to follow up with obstetrician in 10 days.

## 2019-12-26 NOTE — PROGRESS NOTE ADULT - SUBJECTIVE AND OBJECTIVE BOX
S: 34y POD#3  Patient doing well. Minimal to moderate lochia. Pain controlled. Voiding. Passing Flatus. Tolerating a regular diet.   denies palpitations, chest pain or lightheadedness. Breastfeeding.  No leg pain.  O: Vital Signs Last 24 Hrs  T(C): 36.8 (26 Dec 2019 09:53), Max: 37.5 (25 Dec 2019 18:32)  T(F): 98.2 (26 Dec 2019 09:53), Max: 99.5 (25 Dec 2019 18:32)  HR: 80 (26 Dec 2019 09:53) (80 - 97)  BP: 110/71 (26 Dec 2019 09:53) (100/62 - 118/72)  RR: 16 (26 Dec 2019 09:53) (16 - 24)  SpO2: 100% (26 Dec 2019 09:53) (97% - 100%)    Gen: NAD A+Ox3  Abd: soft, NT, ND, fundus firm below umbilicus  Incision: clean, dry, intact  Lochia: minimal to moderate  Ext: no tenderness b/l, uniform b/l edema +1    Labs:                        7.9    6.14  )-----------( 95       ( 26 Dec 2019 05:35 )             23.8       A: 34y POD# 3 s/p RCD. Doing well. Transferred to floor from ICU, s/p PRBC and FFP transfusion. H/h stable. hx of postop hemorrhage due to uterine atony. Presently no heavy vaginal bleeding or clots.   patient feels well and vs stable. Desires to go home today.   Plan: Increase ambulation. Advance diet as tolerated. Tylenol and Motrin q6 hrs. Oxycodone prn.   DVT prophylaxis. D/c home today. Patient instructed to continue iron therapy and follow up with obstetrician in 10 days for postop visit

## 2019-12-26 NOTE — DISCHARGE NOTE OB - MATERIALS PROVIDED
Bottle Feeding Log/Birth Certificate Instructions/Breastfeeding Log/Guide to Postpartum Care/Shaken Baby Prevention Handout

## 2019-12-26 NOTE — DISCHARGE NOTE OB - CARE PROVIDER_API CALL
Autumn Salazar (DO)  Obstetrics and Gynecology  [address] [place] [address]  Phone: [phone]  Fax: [phone]  Follow Up Time:

## 2019-12-26 NOTE — DISCHARGE NOTE OB - MEDICATION SUMMARY - MEDICATIONS TO STOP TAKING
I will STOP taking the medications listed below when I get home from the hospital:    nitrofurantoin macrocrystals 100 mg oral capsule  -- 1 cap(s) by mouth 2 times a day    Iron  -- 45 mg po daily

## 2019-12-26 NOTE — PROGRESS NOTE ADULT - SUBJECTIVE AND OBJECTIVE BOX
OB Postpartum Note:  Delivery, POD#3    S: 35yo POD#3 s/p LTCS. The patient feels well.  Pain is well controlled. She is tolerating a regular diet and passing flatus. She is voiding spontaneously, and ambulating without difficulty. Denies CP/SOB. Denies lightheadedness/dizziness. Denies N/V.    O:  Vitals:  Vital Signs Last 24 Hrs  T(C): 36.8 (26 Dec 2019 05:44), Max: 37.5 (25 Dec 2019 18:32)  T(F): 98.2 (26 Dec 2019 05:44), Max: 99.5 (25 Dec 2019 18:32)  HR: 80 (26 Dec 2019 05:44) (80 - 97)  BP: 108/62 (26 Dec 2019 05:44) (100/62 - 121/67)  BP(mean): --  RR: 18 (26 Dec 2019 05:44) (18 - 24)  SpO2: 100% (26 Dec 2019 05:44) (97% - 100%)    MEDICATIONS  (STANDING):  acetaminophen   Tablet .. 975 milliGRAM(s) Oral every 6 hours  acetaminophen   Tablet .. 975 milliGRAM(s) Oral <User Schedule>  ascorbic acid 500 milliGRAM(s) Oral daily  diphtheria/tetanus/pertussis (acellular) Vaccine (ADAcel) 0.5 milliLiter(s) IntraMuscular once  ferrous    sulfate 325 milliGRAM(s) Oral three times a day  heparin  Injectable 5000 Unit(s) SubCutaneous every 12 hours  ibuprofen  Tablet. 600 milliGRAM(s) Oral every 6 hours  influenza   Vaccine 0.5 milliLiter(s) IntraMuscular once  prenatal multivitamin 1 Tablet(s) Oral daily  senna 2 Tablet(s) Oral daily    MEDICATIONS  (PRN):  diphenhydrAMINE 25 milliGRAM(s) Oral every 6 hours PRN Itching  glycerin Suppository - Adult 1 Suppository(s) Rectal at bedtime PRN Constipation  lanolin Ointment 1 Application(s) Topical every 6 hours PRN Sore Nipples  magnesium hydroxide Suspension 30 milliLiter(s) Oral two times a day PRN Constipation  oxyCODONE    IR 5 milliGRAM(s) Oral once PRN Moderate to Severe Pain (4-10)  oxyCODONE    IR 5 milliGRAM(s) Oral every 3 hours PRN Moderate to Severe Pain (4-10)  oxyCODONE    IR 5 milliGRAM(s) Oral every 3 hours PRN Moderate to Severe Pain (4-10)  oxyCODONE    IR 5 milliGRAM(s) Oral once PRN Moderate to Severe Pain (4-10)  simethicone 80 milliGRAM(s) Chew every 4 hours PRN Gas      LABS:  Blood type: A Positive  Rubella IgG: RPR: Negative                          7.9<L>   6.14 >-----------< 95<L>    (  @ 05:35 )             23.8<L>                        8.1<L>   8.07 >-----------< 95<L>    (  @ 21:10 )             24.4<L>                        7.8<L>   7.93 >-----------< 88<L>    (  @ 08:05 )             23.9<L>                        7.8<L>   7.86 >-----------< 79<L>    (  @ 02:10 )             24.0<L>                        8.1<L>   8.00 >-----------< 73<L>    (  @ 20:01 )             24.2<L>                        6.7<LL>   7.97 >-----------< 77<L>    (  @ 14:10 )             20.6<LL>                        7.2<L>   8.62 >-----------< 86<L>    (  @ 08:33 )             22.3<L>                        7.0<LL>   10.02 >-----------< 108<L>    (  03:54 )             21.1<L>                        8.1<L>   11.25<H> >-----------< 110<L>    (  @ 00:30 )             24.0<L>                        7.6<L>   6.13 >-----------< 101<L>    (  @ 19:30 )             24.9<L>                        8.6<L>   4.91 >-----------< 113<L>    (  @ 11:35 )             27.8<L>    19 @ 05:35      139  |  109<H>  |  8   ----------------------------<  76  3.6   |  22  |  0.51    19 @ 21:10      137  |  109<H>  |  8   ----------------------------<  80  3.8   |  20<L>  |  0.46<L>    19 @ 02:10      137  |  107  |  5<L>  ----------------------------<  75  3.8   |  21<L>  |  0.50    19 @ 14:10      136  |  106  |  5<L>  ----------------------------<  88  3.3<L>   |  21<L>  |  0.46<L>    19 @ 08:33      136  |  104  |  5<L>  ----------------------------<  97  3.4<L>   |  21<L>  |  0.45<L>    19 @ 03:54      136  |  105  |  5<L>  ----------------------------<  109<H>  3.6   |  19<L>  |  0.45<L>    19 @ 00:30      136  |  105  |  5<L>  ----------------------------<  97  3.6   |  20<L>  |  0.42<L>    19 @ 19:30      137  |  108<H>  |  4<L>  ----------------------------<  101<H>  3.7   |  19<L>  |  0.47<L>        Ca    8.1<L>      26 Dec 2019 05:35  Ca    8.0<L>      25 Dec 2019 21:10  Ca    7.9<L>      25 Dec 2019 02:10  Ca    8.2<L>      24 Dec 2019 14:10  Ca    8.1<L>      24 Dec 2019 08:33  Ca    7.9<L>      24 Dec 2019 03:54  Ca    8.3<L>      24 Dec 2019 00:30  Ca    8.3<L>      23 Dec 2019 19:30  Phos  3.4     12-26  Phos  3.2     12-25  Phos  2.9     12-25  Phos  3.5     12-24  Phos  3.6     12-24  Mg     1.8     12-26  Mg     1.8     12-25  Mg     1.8     12-25  Mg     2.0     12-24  Mg     1.2<L>         TPro  5.1<L>  /  Alb  2.7<L>  /  TBili  0.5  /  DBili  x   /  AST  14  /  ALT  7   /  AlkPhos  81  19 @ 05:35  TPro  4.9<L>  /  Alb  2.6<L>  /  TBili  0.6  /  DBili  x   /  AST  16  /  ALT  6   /  AlkPhos  83  19 @ 21:10  TPro  4.3<L>  /  Alb  2.6<L>  /  TBili  0.8  /  DBili  x   /  AST  14  /  ALT  5   /  AlkPhos  101  19 @ 03:54  TPro  4.7<L>  /  Alb  2.7<L>  /  TBili  0.9  /  DBili  x   /  AST  13  /  ALT  5   /  AlkPhos  111  19 @ 00:30  TPro  5.2<L>  /  Alb  2.8<L>  /  TBili  0.4  /  DBili  x   /  AST  12  /  ALT  5   /  AlkPhos  134<H>  19 @ 19:30          Physical exam:  Gen: NAD  Abdomen: Soft, nontender, no distension , firm uterine fundus at umbilicus.  Incision: Clean, dry, and intact   Pelvic: Normal lochia noted  Ext: No calf tenderness

## 2019-12-26 NOTE — DISCHARGE NOTE OB - PATIENT PORTAL LINK FT
You can access the FollowMyHealth Patient Portal offered by [facility] by registering at the following website: [email or web address]. By joining [employer]’s FollowMyHealth portal, you will also be able to view your health information using other applications (apps) compatible with our system.

## 2019-12-26 NOTE — PROGRESS NOTE ADULT - ASSESSMENT
A/P: 35yo POD#3 s/p rLTCS (), PPH 1940+370 s/p Bakri, VPx3 s/p 6u PRBC and 2uFFP. Pt was in SICU and transferred to floor yesterday evening.  Patient is stable and is doing well post-operatively.

## 2020-01-07 DIAGNOSIS — O40.3XX0 POLYHYDRAMNIOS, THIRD TRIMESTER, NOT APPLICABLE OR UNSPECIFIED: ICD-10-CM

## 2020-01-07 DIAGNOSIS — O36.63X0 MATERNAL CARE FOR EXCESSIVE FETAL GROWTH, THIRD TRIMESTER, NOT APPLICABLE OR UNSPECIFIED: ICD-10-CM

## 2020-01-07 DIAGNOSIS — O28.4 ABNORMAL RADIOLOGICAL FINDING ON ANTENATAL SCREENING OF MOTHER: ICD-10-CM

## 2020-01-07 DIAGNOSIS — Z3A.38 38 WEEKS GESTATION OF PREGNANCY: ICD-10-CM

## 2020-02-03 NOTE — OB PROVIDER H&P - ABORTIONS, OB PROFILE
1 [General Appearance - Well Developed] : well developed [General Appearance - Well Nourished] : well nourished [Normal Appearance] : normal appearance [Well Groomed] : well groomed [General Appearance - In No Acute Distress] : no acute distress [Edema] : no peripheral edema [] : no respiratory distress [Respiration, Rhythm And Depth] : normal respiratory rhythm and effort [Exaggerated Use Of Accessory Muscles For Inspiration] : no accessory muscle use [Affect] : the affect was normal [Oriented To Time, Place, And Person] : oriented to person, place, and time [Mood] : the mood was normal [Normal Station and Gait] : the gait and station were normal for the patient's age [Not Anxious] : not anxious [No Focal Deficits] : no focal deficits

## 2020-07-20 ENCOUNTER — RESULT REVIEW (OUTPATIENT)
Age: 35
End: 2020-07-20

## 2021-09-23 ENCOUNTER — EMERGENCY (EMERGENCY)
Facility: HOSPITAL | Age: 36
LOS: 1 days | Discharge: HOME | End: 2021-09-23
Attending: EMERGENCY MEDICINE
Payer: COMMERCIAL

## 2021-09-23 VITALS
SYSTOLIC BLOOD PRESSURE: 114 MMHG | HEART RATE: 59 BPM | HEIGHT: 69 IN | OXYGEN SATURATION: 99 % | TEMPERATURE: 100 F | WEIGHT: 240.97 LBS | RESPIRATION RATE: 18 BRPM | DIASTOLIC BLOOD PRESSURE: 73 MMHG

## 2021-09-23 DIAGNOSIS — Z87.42 PERSONAL HISTORY OF OTHER DISEASES OF THE FEMALE GENITAL TRACT: Chronic | ICD-10-CM

## 2021-09-23 DIAGNOSIS — Z98.891 HISTORY OF UTERINE SCAR FROM PREVIOUS SURGERY: Chronic | ICD-10-CM

## 2021-09-23 LAB
ALBUMIN SERPL ELPH-MCNC: 4.3 G/DL — SIGNIFICANT CHANGE UP (ref 3.3–5.2)
ALP SERPL-CCNC: 44 U/L — SIGNIFICANT CHANGE UP (ref 40–120)
ALT FLD-CCNC: 9 U/L — SIGNIFICANT CHANGE UP
ANION GAP SERPL CALC-SCNC: 13 MMOL/L — SIGNIFICANT CHANGE UP (ref 5–17)
AST SERPL-CCNC: 13 U/L — SIGNIFICANT CHANGE UP
BASOPHILS # BLD AUTO: 0.03 K/UL — SIGNIFICANT CHANGE UP (ref 0–0.2)
BASOPHILS NFR BLD AUTO: 0.6 % — SIGNIFICANT CHANGE UP (ref 0–2)
BILIRUB SERPL-MCNC: 0.4 MG/DL — SIGNIFICANT CHANGE UP (ref 0.4–2)
BUN SERPL-MCNC: 8.5 MG/DL — SIGNIFICANT CHANGE UP (ref 8–20)
CALCIUM SERPL-MCNC: 9.6 MG/DL — SIGNIFICANT CHANGE UP (ref 8.6–10.2)
CHLORIDE SERPL-SCNC: 104 MMOL/L — SIGNIFICANT CHANGE UP (ref 98–107)
CO2 SERPL-SCNC: 20 MMOL/L — LOW (ref 22–29)
CREAT SERPL-MCNC: 0.67 MG/DL — SIGNIFICANT CHANGE UP (ref 0.5–1.3)
EOSINOPHIL # BLD AUTO: 0.06 K/UL — SIGNIFICANT CHANGE UP (ref 0–0.5)
EOSINOPHIL NFR BLD AUTO: 1.1 % — SIGNIFICANT CHANGE UP (ref 0–6)
GLUCOSE SERPL-MCNC: 93 MG/DL — SIGNIFICANT CHANGE UP (ref 70–99)
HCG SERPL-ACNC: <4 MIU/ML — SIGNIFICANT CHANGE UP
HCT VFR BLD CALC: 36.6 % — SIGNIFICANT CHANGE UP (ref 34.5–45)
HGB BLD-MCNC: 11.8 G/DL — SIGNIFICANT CHANGE UP (ref 11.5–15.5)
IMM GRANULOCYTES NFR BLD AUTO: 0.4 % — SIGNIFICANT CHANGE UP (ref 0–1.5)
LYMPHOCYTES # BLD AUTO: 2.33 K/UL — SIGNIFICANT CHANGE UP (ref 1–3.3)
LYMPHOCYTES # BLD AUTO: 44.1 % — HIGH (ref 13–44)
MAGNESIUM SERPL-MCNC: 1.9 MG/DL — SIGNIFICANT CHANGE UP (ref 1.6–2.6)
MCHC RBC-ENTMCNC: 26.8 PG — LOW (ref 27–34)
MCHC RBC-ENTMCNC: 32.2 GM/DL — SIGNIFICANT CHANGE UP (ref 32–36)
MCV RBC AUTO: 83 FL — SIGNIFICANT CHANGE UP (ref 80–100)
MONOCYTES # BLD AUTO: 0.43 K/UL — SIGNIFICANT CHANGE UP (ref 0–0.9)
MONOCYTES NFR BLD AUTO: 8.1 % — SIGNIFICANT CHANGE UP (ref 2–14)
NEUTROPHILS # BLD AUTO: 2.41 K/UL — SIGNIFICANT CHANGE UP (ref 1.8–7.4)
NEUTROPHILS NFR BLD AUTO: 45.7 % — SIGNIFICANT CHANGE UP (ref 43–77)
NT-PROBNP SERPL-SCNC: 6 PG/ML — SIGNIFICANT CHANGE UP (ref 0–300)
PLATELET # BLD AUTO: 184 K/UL — SIGNIFICANT CHANGE UP (ref 150–400)
POTASSIUM SERPL-MCNC: 3.9 MMOL/L — SIGNIFICANT CHANGE UP (ref 3.5–5.3)
POTASSIUM SERPL-SCNC: 3.9 MMOL/L — SIGNIFICANT CHANGE UP (ref 3.5–5.3)
PROT SERPL-MCNC: 7.3 G/DL — SIGNIFICANT CHANGE UP (ref 6.6–8.7)
RBC # BLD: 4.41 M/UL — SIGNIFICANT CHANGE UP (ref 3.8–5.2)
RBC # FLD: 13.5 % — SIGNIFICANT CHANGE UP (ref 10.3–14.5)
SODIUM SERPL-SCNC: 137 MMOL/L — SIGNIFICANT CHANGE UP (ref 135–145)
TROPONIN T SERPL-MCNC: <0.01 NG/ML — SIGNIFICANT CHANGE UP (ref 0–0.06)
WBC # BLD: 5.28 K/UL — SIGNIFICANT CHANGE UP (ref 3.8–10.5)
WBC # FLD AUTO: 5.28 K/UL — SIGNIFICANT CHANGE UP (ref 3.8–10.5)

## 2021-09-23 PROCEDURE — 36415 COLL VENOUS BLD VENIPUNCTURE: CPT

## 2021-09-23 PROCEDURE — 85025 COMPLETE CBC W/AUTO DIFF WBC: CPT

## 2021-09-23 PROCEDURE — 84484 ASSAY OF TROPONIN QUANT: CPT

## 2021-09-23 PROCEDURE — 83880 ASSAY OF NATRIURETIC PEPTIDE: CPT

## 2021-09-23 PROCEDURE — 99285 EMERGENCY DEPT VISIT HI MDM: CPT

## 2021-09-23 PROCEDURE — 71045 X-RAY EXAM CHEST 1 VIEW: CPT | Mod: 26

## 2021-09-23 PROCEDURE — 93005 ELECTROCARDIOGRAM TRACING: CPT

## 2021-09-23 PROCEDURE — 84702 CHORIONIC GONADOTROPIN TEST: CPT

## 2021-09-23 PROCEDURE — 71045 X-RAY EXAM CHEST 1 VIEW: CPT

## 2021-09-23 PROCEDURE — 80053 COMPREHEN METABOLIC PANEL: CPT

## 2021-09-23 PROCEDURE — 83735 ASSAY OF MAGNESIUM: CPT

## 2021-09-23 PROCEDURE — 99283 EMERGENCY DEPT VISIT LOW MDM: CPT | Mod: 25

## 2021-09-23 PROCEDURE — 93010 ELECTROCARDIOGRAM REPORT: CPT

## 2021-09-23 NOTE — ED PROVIDER NOTE - CLINICAL SUMMARY MEDICAL DECISION MAKING FREE TEXT BOX
35 y/o female with 2 weeks of neck and chest pain, hx of anemia, doubt ACS. Will check EKG, labs, and follow up.

## 2021-09-23 NOTE — ED PROVIDER NOTE - PATIENT PORTAL LINK FT
You can access the FollowMyHealth Patient Portal offered by [facility] by registering at the following website: [email or web address]. By joining [employer]’s FollowMyHealth portal, you will also be able to view your health information using other applications (apps) compatible with our system.

## 2021-09-23 NOTE — ED PROVIDER NOTE - PROGRESS NOTE DETAILS
patient comfortable in ED  results d/w patient and need for follow u  patient verbalized understanding and agrees with plan  -md pam

## 2021-09-23 NOTE — ED PROVIDER NOTE - OBJECTIVE STATEMENT
37 y/o female with PMHx of Anemia hx of 1 transfusion in the past, and COVID in 4/2021 presents to ED c/o chest pain. Patient reports she was seen at urgent care today for sharp pain in her left shoulder that radiates to the chest x2 weeks, and was referred to the ED. Pain lasts 1-2 minutes when episodes occur, did not take anything for the pain, with mild shortness of breath when the pain is present. Patient is not vaccinated for COVID.     Denies N/V  PMD: Dr. Alicia Breen

## 2021-09-23 NOTE — ED PROVIDER NOTE - NS_EDPROVIDERDISPOUSERTYPE_ED_A_ED
none Scribe Attestation (For Scribes USE Only)... Attending Attestation (For Attendings USE Only).../Scribe Attestation (For Scribes USE Only)...

## 2021-09-23 NOTE — ED PROVIDER NOTE - CARE PROVIDER_API CALL
Daniel Flor)  Cardiovascular Disease  [address], [place] [address]  Phone: [phone]  Fax: [phone]  Follow Up Time:

## 2021-09-23 NOTE — ED PROVIDER NOTE - MUSCULOSKELETAL, MLM
Spine appears normal, range of motion is not limited, no muscle or joint tenderness, no calf tenderness or pedal edema

## 2021-09-24 VITALS
SYSTOLIC BLOOD PRESSURE: 134 MMHG | OXYGEN SATURATION: 99 % | TEMPERATURE: 99 F | DIASTOLIC BLOOD PRESSURE: 79 MMHG | HEART RATE: 58 BPM | RESPIRATION RATE: 18 BRPM

## 2021-09-24 NOTE — ED ADULT NURSE NOTE - OBJECTIVE STATEMENT
Patient is alert and oriented X4 from home. PAtient is actively complaining of chest pain X2 days. Patient has been having this pain on and off and worsens with movement.

## 2021-09-24 NOTE — ED ADULT NURSE NOTE - NSIMPLEMENTINTERV_GEN_ALL_ED
Implemented All Universal Safety Interventions:  [place] to call system. Call bell, personal items and telephone within reach. Instruct patient to call for assistance. Room bathroom lighting operational. Non-slip footwear when patient is off stretcher. Physically safe environment: no spills, clutter or unnecessary equipment. Stretcher in lowest position, wheels locked, appropriate side rails in place.

## 2021-09-24 NOTE — ED ADULT NURSE NOTE - NS ED NURSE LEVEL OF CONSCIOUSNESS ORIENTATION
Oriented - self; Oriented - place; Oriented - time strengthening/balance training/ROM/transfer training/gait training

## 2021-09-24 NOTE — ED ADULT NURSE NOTE - NSFALLRSKASSESSTYPE_ED_ALL_ED
Writer notified patient that Z pack was sent to his pharmacy. Discussed instructions to take and to call back if no improvement or feeling worse. Patient verbalized understanding and agreed with plan.   Initial (On Arrival)

## 2021-09-24 NOTE — ED ADULT NURSE NOTE - FINAL NURSING ELECTRONIC SIGNATURE
Anesthesia Evaluation     . Pt has had prior anesthetic. Type: General    No history of anesthetic complications          ROS/MED HX    ENT/Pulmonary:     (+)sleep apnea, uses CPAP , . .    Neurologic:  - neg neurologic ROS     Cardiovascular:     (+) Dyslipidemia, hypertension----. : . . . :. .       METS/Exercise Tolerance:     Hematologic: Comments: Pulmonary embolism after previous back surgery    (+) History of blood clots pt is not anticoagulated, -      Musculoskeletal:  - neg musculoskeletal ROS       GI/Hepatic:     (+) GERD Asymptomatic on medication,       Renal/Genitourinary:  - ROS Renal section negative       Endo:  - neg endo ROS       Psychiatric:  - neg psychiatric ROS       Infectious Disease:  - neg infectious disease ROS       Malignancy:   (+) Malignancy History of Prostate  Prostate CA Active status post,         Other:    - neg other ROS                 Physical Exam  Normal systems: cardiovascular, pulmonary and dental    Airway   Mallampati: I  TM distance: >3 FB  Neck ROM: full    Dental     Cardiovascular       Pulmonary    breath sounds clear to auscultation                    Anesthesia Plan      History & Physical Review  History and physical reviewed and following examination; no interval change.    ASA Status:  3 .    NPO Status:  > 6 hours    Plan for General and LMA with Intravenous and Propofol induction. Maintenance will be Balanced.    PONV prophylaxis:  Ondansetron (or other 5HT-3) and Dexamethasone or Solumedrol       Postoperative Care  Postoperative pain management:  Multi-modal analgesia.      Consents  Anesthetic plan, risks, benefits and alternatives discussed with:  Patient and Spouse..                          .   24-Sep-2021 01:18

## 2021-12-07 NOTE — DISCHARGE NOTE OB - NS AS DC FU INST LIST INST
[address] [facility] A department of [place] [address]  Phone: [phone]  Fax: [phone]    ITALO Monge CNP        December 7, 2021     Patient: Curt Henriquez   YOB: 1977   Date of Visit: 12/7/2021       To Whom it May Concern:    Curt Henriquez was seen in my clinic on 12/7/2021. He may return to work on 12/9/21. Recommend no heavy lifting until seen by GI doctor. .    If you have any questions or concerns, please don't hesitate to call.     Sincerely,         ITALO Monge CNP no

## 2022-06-08 ENCOUNTER — RESULT CHARGE (OUTPATIENT)
Age: 37
End: 2022-06-08

## 2022-06-08 ENCOUNTER — APPOINTMENT (OUTPATIENT)
Age: 37
End: 2022-06-08
Payer: COMMERCIAL

## 2022-06-08 VITALS — SYSTOLIC BLOOD PRESSURE: 105 MMHG | DIASTOLIC BLOOD PRESSURE: 71 MMHG | TEMPERATURE: 97.9 F

## 2022-06-08 DIAGNOSIS — Z83.3 FAMILY HISTORY OF DIABETES MELLITUS: ICD-10-CM

## 2022-06-08 DIAGNOSIS — R32 UNSPECIFIED URINARY INCONTINENCE: ICD-10-CM

## 2022-06-08 DIAGNOSIS — Z82.49 FAMILY HISTORY OF ISCHEMIC HEART DISEASE AND OTHER DISEASES OF THE CIRCULATORY SYSTEM: ICD-10-CM

## 2022-06-08 DIAGNOSIS — R10.2 PELVIC AND PERINEAL PAIN: ICD-10-CM

## 2022-06-08 DIAGNOSIS — N94.10 UNSPECIFIED DYSPAREUNIA: ICD-10-CM

## 2022-06-08 DIAGNOSIS — Z87.19 PERSONAL HISTORY OF OTHER DISEASES OF THE DIGESTIVE SYSTEM: ICD-10-CM

## 2022-06-08 DIAGNOSIS — M53.9 DORSOPATHY, UNSPECIFIED: ICD-10-CM

## 2022-06-08 DIAGNOSIS — Z78.9 OTHER SPECIFIED HEALTH STATUS: ICD-10-CM

## 2022-06-08 DIAGNOSIS — Z86.59 PERSONAL HISTORY OF OTHER MENTAL AND BEHAVIORAL DISORDERS: ICD-10-CM

## 2022-06-08 DIAGNOSIS — R39.15 URGENCY OF URINATION: ICD-10-CM

## 2022-06-08 LAB
BILIRUB UR QL STRIP: NEGATIVE
CLARITY UR: CLEAR
COLLECTION METHOD: NORMAL
GLUCOSE UR-MCNC: NEGATIVE
HCG UR QL: 4 EU/DL
HGB UR QL STRIP.AUTO: NEGATIVE
KETONES UR-MCNC: NEGATIVE
LEUKOCYTE ESTERASE UR QL STRIP: NORMAL
NITRITE UR QL STRIP: NEGATIVE
PH UR STRIP: 7
PROT UR STRIP-MCNC: NEGATIVE
SP GR UR STRIP: 1.02

## 2022-06-08 PROCEDURE — 99204 OFFICE O/P NEW MOD 45 MIN: CPT | Mod: 25

## 2022-06-08 PROCEDURE — 51701 INSERT BLADDER CATHETER: CPT

## 2022-06-08 PROCEDURE — 81003 URINALYSIS AUTO W/O SCOPE: CPT | Mod: QW

## 2022-06-08 NOTE — OB HISTORY
[ ___] : [unfilled]  section delivery(s) [unknown] : the patient is unsure of the date of her LMP [Last Pap Smear ___] : date of last pap smear was on [unfilled] [Sexually Active] : sexually active

## 2022-06-08 NOTE — PROCEDURE
[Straight Catheterization] : insertion of a straight catheter [Urinary Frequency] : urinary frequency [Patient] : the patient [___ Fr Straight Tip] : a [unfilled] in [place] straight tip catheter [None] : there were no complications with the catheter insertion [Clear] : clear [No Complications] : no complications [Tolerated Well] : the patient tolerated the procedure well [Post procedure instructions and information given] : Post procedure instructions and information were given and reviewed with patient. [0] : 0

## 2022-06-08 NOTE — HISTORY OF PRESENT ILLNESS
[Cystocele (Obstetric)] : no [Uterine Prolapse] : no [Vaginal Wall Prolapse] : no [Rectal Prolapse] : no [Unable To Restrain Bowel Movement] : mild [Feelings Of Urinary Urgency] : no [Hematuria] : no [Urinary Frequency More Than Twice At Night (Nocturia)] : no nocturia [Urinary Tract Infection] : no [Constipation Obstructed Defecation] : no [Incomplete Emptying Of Stool] : no [] : mild [FreeTextEntry1] : \par presents with AISHA/UUI, since 2 years last delivery, reports lower abdominal pain, reports the last time she saw GYN she was told she has prolapse, no leakage of stool/constipation, no longer having children, had PPH after last delivery, she currently has an IUD in place, she denies bulge, no abnormal bleeding

## 2022-06-08 NOTE — DISCUSSION/SUMMARY
[FreeTextEntry1] : \par Janet presents for eval of dyspareunia and UUI. On exam levator spasm, negative CST, no POP. Based on her exams and symptoms we discussed pelvic floor dysfunction with significant levator spasm. We discussed management options including relaxation techniques including stress reduction, avoiding pushing and straining, aveeno oatmeal baths 15 minutes twice daily, and management of constipation. We discussed the importance of physical therapy. She will start with PT and return in 2-3 months, all questions answered\par \par

## 2022-06-08 NOTE — PHYSICAL EXAM
[Chaperone Present] : A chaperone was present in the examining room during all aspects of the physical examination [No Acute Distress] : in no acute distress [Well developed] : well developed [Well Nourished] : ~L well nourished [Oriented x3] : oriented to person, place, and time [Normal Memory] : ~T memory was ~L unimpaired [Normal Mood/Affect] : mood and affect are normal [Cough] : no cough [No Edema] : ~T edema was not present [Tenderness] : ~T no ~M abdominal tenderness observed [Distended] : not distended [None] : no CVA tenderness [Inguinal LAD] : no adenopathy was noted in the inguinal lymph nodes [Warm and Dry] : was warm and dry to touch [Labia Majora] : were normal [Labia Minora] : were normal [Normal Appearance] : general appearance was normal [2] : 2 [Uterine Adnexae] : were not tender and not enlarged [Normal] : no abnormalities [Post Void Residual ____ml] : post void residual was [unfilled] ml [FreeTextEntry3] : neg CST  [FreeTextEntry4] : levator spasm  [de-identified] : no POP

## 2022-06-13 LAB
APPEARANCE: CLEAR
BACTERIA UR CULT: NORMAL
BACTERIA: NEGATIVE
BILIRUBIN URINE: NEGATIVE
BLOOD URINE: NEGATIVE
COLOR: YELLOW
GLUCOSE QUALITATIVE U: NEGATIVE
HYALINE CASTS: 0 /LPF
KETONES URINE: NEGATIVE
LEUKOCYTE ESTERASE URINE: ABNORMAL
MICROSCOPIC-UA: NORMAL
NITRITE URINE: NEGATIVE
PH URINE: 6.5
PROTEIN URINE: NORMAL
RED BLOOD CELLS URINE: 1 /HPF
SPECIFIC GRAVITY URINE: 1.02
SQUAMOUS EPITHELIAL CELLS: 9 /HPF
UROBILINOGEN URINE: ABNORMAL
WHITE BLOOD CELLS URINE: 1 /HPF

## 2022-09-12 ENCOUNTER — APPOINTMENT (OUTPATIENT)
Age: 37
End: 2022-09-12

## 2023-01-30 NOTE — ED PROVIDER NOTE - RESPIRATORY [+], MLM
SHORTNESS OF BREATH Propranolol Pregnancy And Lactation Text: This medication is Pregnancy Category C and it isn't known if it is safe during pregnancy. It is excreted in breast milk.

## 2025-02-28 NOTE — OB RN DELIVERY SUMMARY - BABY A: APGAR 1 MIN RESP RATE, DELIVERY
Encounter addended by: CHETAN Muñoz on: 2/28/2025 1:21 PM   Actions taken: Charge Capture section accepted (1) weak, irregular

## 2025-03-12 NOTE — OB NEONATOLOGY/PEDIATRICIAN DELIVERY SUMMARY - NSPEDSNEONOTESA_OBGYN_ALL_OB_FT
Called and asked patient to send carelink transmission. Patient will try, battery not holding a charge. I gave patient phone number to [employer] to leonardo for a replacement handset.   Baby girl born @ 39 weeks via repeat c/s to a 31 y/o [A+]  mother.  Maternal hx significant for polyhydramnios and anemia requiring iron transfusions x6 during pregnancy. Prenatal hx significant for fetal alert: prominent 3rd ventricle of the brain with recommendation for  follow-up. PNL NR/immune/-.  GBS positive on .  No rupture no labor.  Baby emerged vigorous with moderate cry. Required 10 minutes CPAP PEEP 5 FiO2 21% for nasal flaring and low oxygen saturation, with improvement.  W/d/s/s with APGARs of 7/8.  Mom desires hep B, circ, breast feeding.      :   TOB: 1633  ADOD:   BW: 4070 LGA